# Patient Record
Sex: FEMALE | Race: WHITE | NOT HISPANIC OR LATINO | Employment: FULL TIME | URBAN - METROPOLITAN AREA
[De-identification: names, ages, dates, MRNs, and addresses within clinical notes are randomized per-mention and may not be internally consistent; named-entity substitution may affect disease eponyms.]

---

## 2017-09-18 ENCOUNTER — ALLSCRIPTS OFFICE VISIT (OUTPATIENT)
Dept: OTHER | Facility: OTHER | Age: 37
End: 2017-09-18

## 2017-09-18 DIAGNOSIS — M75.41 IMPINGEMENT SYNDROME OF RIGHT SHOULDER: ICD-10-CM

## 2017-09-18 DIAGNOSIS — G56.01 CARPAL TUNNEL SYNDROME OF RIGHT WRIST: ICD-10-CM

## 2017-09-22 ENCOUNTER — TRANSCRIBE ORDERS (OUTPATIENT)
Dept: LAB | Facility: HOSPITAL | Age: 37
End: 2017-09-22

## 2017-09-22 ENCOUNTER — APPOINTMENT (OUTPATIENT)
Dept: LAB | Facility: HOSPITAL | Age: 37
End: 2017-09-22
Attending: OBSTETRICS & GYNECOLOGY
Payer: COMMERCIAL

## 2017-09-22 DIAGNOSIS — Z22.4 GONORRHEA CARRIER: ICD-10-CM

## 2017-09-22 DIAGNOSIS — R76.0 RAISED ANTIBODY TITER: ICD-10-CM

## 2017-09-22 DIAGNOSIS — Z11.8 SCREENING EXAMINATION FOR TRACHOMA: ICD-10-CM

## 2017-09-22 DIAGNOSIS — Z11.59 SCREENING EXAMINATION FOR POLIOMYELITIS: ICD-10-CM

## 2017-09-22 DIAGNOSIS — Z31.41 FERTILITY TESTING: Primary | ICD-10-CM

## 2017-09-22 DIAGNOSIS — E07.9 UNSPECIFIED DISORDER OF THYROID: ICD-10-CM

## 2017-09-22 DIAGNOSIS — Z11.3 SCREENING EXAMINATION FOR VENEREAL DISEASE: ICD-10-CM

## 2017-09-22 DIAGNOSIS — D64.9 ANEMIA, UNSPECIFIED: ICD-10-CM

## 2017-09-22 DIAGNOSIS — Z01.83 ENCOUNTER FOR BLOOD TYPING: ICD-10-CM

## 2017-09-22 DIAGNOSIS — E28.2 POLYCYSTIC OVARIES: ICD-10-CM

## 2017-09-22 DIAGNOSIS — Z01.83 ENCOUNTER FOR BLOOD TYPING: Primary | ICD-10-CM

## 2017-09-22 DIAGNOSIS — E34.9 UNSPECIFIED ENDOCRINE DISORDER: ICD-10-CM

## 2017-09-22 DIAGNOSIS — Z31.41 FERTILITY TESTING: ICD-10-CM

## 2017-09-22 LAB
ABO GROUP BLD: NORMAL
BASOPHILS # BLD AUTO: 0.02 THOUSANDS/ΜL (ref 0–0.1)
BASOPHILS NFR BLD AUTO: 0 % (ref 0–1)
BLD GP AB SCN SERPL QL: NEGATIVE
EOSINOPHIL # BLD AUTO: 0.09 THOUSAND/ΜL (ref 0–0.61)
EOSINOPHIL NFR BLD AUTO: 2 % (ref 0–6)
ERYTHROCYTE [DISTWIDTH] IN BLOOD BY AUTOMATED COUNT: 12.8 % (ref 11.6–15.1)
HCT VFR BLD AUTO: 38.4 % (ref 34.8–46.1)
HGB BLD-MCNC: 13.3 G/DL (ref 11.5–15.4)
LYMPHOCYTES # BLD AUTO: 1.91 THOUSANDS/ΜL (ref 0.6–4.47)
LYMPHOCYTES NFR BLD AUTO: 37 % (ref 14–44)
MCH RBC QN AUTO: 29.9 PG (ref 26.8–34.3)
MCHC RBC AUTO-ENTMCNC: 34.6 G/DL (ref 31.4–37.4)
MCV RBC AUTO: 86 FL (ref 82–98)
MONOCYTES # BLD AUTO: 0.25 THOUSAND/ΜL (ref 0.17–1.22)
MONOCYTES NFR BLD AUTO: 5 % (ref 4–12)
NEUTROPHILS # BLD AUTO: 2.86 THOUSANDS/ΜL (ref 1.85–7.62)
NEUTS SEG NFR BLD AUTO: 56 % (ref 43–75)
NRBC BLD AUTO-RTO: 0 /100 WBCS
PLATELET # BLD AUTO: 252 THOUSANDS/UL (ref 149–390)
PMV BLD AUTO: 10.7 FL (ref 8.9–12.7)
PROLACTIN SERPL-MCNC: 7.6 NG/ML
RBC # BLD AUTO: 4.45 MILLION/UL (ref 3.81–5.12)
RH BLD: POSITIVE
RUBV IGG SERPL IA-ACNC: 106.5 IU/ML
SPECIMEN EXPIRATION DATE: NORMAL
T3FREE SERPL-MCNC: 2.18 PG/ML (ref 2.3–4.2)
T4 FREE SERPL-MCNC: 0.85 NG/DL (ref 0.76–1.46)
TSH SERPL DL<=0.05 MIU/L-ACNC: 1.39 UIU/ML (ref 0.36–3.74)
WBC # BLD AUTO: 5.14 THOUSAND/UL (ref 4.31–10.16)

## 2017-09-22 PROCEDURE — 86787 VARICELLA-ZOSTER ANTIBODY: CPT

## 2017-09-22 PROCEDURE — 86644 CMV ANTIBODY: CPT

## 2017-09-22 PROCEDURE — 84443 ASSAY THYROID STIM HORMONE: CPT

## 2017-09-22 PROCEDURE — 83516 IMMUNOASSAY NONANTIBODY: CPT

## 2017-09-22 PROCEDURE — 86803 HEPATITIS C AB TEST: CPT

## 2017-09-22 PROCEDURE — 84146 ASSAY OF PROLACTIN: CPT

## 2017-09-22 PROCEDURE — 80081 OBSTETRIC PANEL INC HIV TSTG: CPT

## 2017-09-22 PROCEDURE — 87491 CHLMYD TRACH DNA AMP PROBE: CPT

## 2017-09-22 PROCEDURE — 86631 CHLAMYDIA ANTIBODY: CPT

## 2017-09-22 PROCEDURE — 84481 FREE ASSAY (FT-3): CPT

## 2017-09-22 PROCEDURE — 87591 N.GONORRHOEAE DNA AMP PROB: CPT

## 2017-09-22 PROCEDURE — 36415 COLL VENOUS BLD VENIPUNCTURE: CPT

## 2017-09-22 PROCEDURE — 87661 TRICHOMONAS VAGINALIS AMPLIF: CPT

## 2017-09-22 PROCEDURE — 86704 HEP B CORE ANTIBODY TOTAL: CPT

## 2017-09-22 PROCEDURE — 86645 CMV ANTIBODY IGM: CPT

## 2017-09-22 PROCEDURE — 86790 VIRUS ANTIBODY NOS: CPT

## 2017-09-22 PROCEDURE — 84439 ASSAY OF FREE THYROXINE: CPT

## 2017-09-23 LAB
HBV CORE AB SER QL: NORMAL
HBV SURFACE AG SER QL: NORMAL
HCV AB SER QL: ABNORMAL
HTLV I+II AB SER QL IA: NEGATIVE

## 2017-09-25 LAB
CHLAMYDIA DNA CVX QL NAA+PROBE: NORMAL
HIV 1+2 AB+HIV1 P24 AG SERPL QL IA: NORMAL
MIS SERPL-MCNC: 1.15 NG/ML
N GONORRHOEA DNA GENITAL QL NAA+PROBE: NORMAL
RPR SER QL: NORMAL

## 2017-09-26 LAB
CMV IGG SERPL IA-ACNC: >10 U/ML (ref 0–0.59)
CMV IGM SERPL IA-ACNC: <30 AU/ML (ref 0–29.9)
LABORATORY COMMENT REPORT: NORMAL
VZV IGG SER IA-ACNC: NORMAL

## 2017-09-27 LAB
CHLAMYDIA IGG SER-ACNC: <0.91 RATIO (ref 0–0.9)
T VAGINALIS RRNA SPEC QL NAA+PROBE: NEGATIVE

## 2017-11-07 ENCOUNTER — GENERIC CONVERSION - ENCOUNTER (OUTPATIENT)
Dept: OTHER | Facility: OTHER | Age: 37
End: 2017-11-07

## 2017-11-09 ENCOUNTER — GENERIC CONVERSION - ENCOUNTER (OUTPATIENT)
Dept: OTHER | Facility: OTHER | Age: 37
End: 2017-11-09

## 2017-11-16 ENCOUNTER — GENERIC CONVERSION - ENCOUNTER (OUTPATIENT)
Dept: OTHER | Facility: OTHER | Age: 37
End: 2017-11-16

## 2017-11-16 ENCOUNTER — ALLSCRIPTS OFFICE VISIT (OUTPATIENT)
Dept: PERINATAL CARE | Facility: CLINIC | Age: 37
End: 2017-11-16
Payer: COMMERCIAL

## 2017-11-16 PROCEDURE — 76813 OB US NUCHAL MEAS 1 GEST: CPT | Performed by: OBSTETRICS & GYNECOLOGY

## 2017-11-16 PROCEDURE — 76801 OB US < 14 WKS SINGLE FETUS: CPT | Performed by: OBSTETRICS & GYNECOLOGY

## 2017-11-29 ENCOUNTER — GENERIC CONVERSION - ENCOUNTER (OUTPATIENT)
Dept: OTHER | Facility: OTHER | Age: 37
End: 2017-11-29

## 2017-12-01 ENCOUNTER — GENERIC CONVERSION - ENCOUNTER (OUTPATIENT)
Dept: OTHER | Facility: OTHER | Age: 37
End: 2017-12-01

## 2017-12-07 ENCOUNTER — LAB CONVERSION - ENCOUNTER (OUTPATIENT)
Dept: OTHER | Facility: OTHER | Age: 37
End: 2017-12-07

## 2017-12-07 ENCOUNTER — GENERIC CONVERSION - ENCOUNTER (OUTPATIENT)
Dept: OTHER | Facility: OTHER | Age: 37
End: 2017-12-07

## 2017-12-07 LAB
ABNORMAL MSS? (HISTORICAL): NORMAL
ABNORMAL US? (HISTORICAL): NORMAL
ADVANCED MATERNAL AGE? (HISTORICAL): NORMAL
CHROMOSOME ANALYSIS (HISTORICAL): NORMAL
CHROMOSOME, BLOOD (HISTORICAL): NOT DETECTED
CLINICAL HISTORY (HISTORICAL): NORMAL
COMMENTS: (HISTORICAL): NORMAL
FETAL FRACTION (HISTORICAL): NORMAL
GESTATIONAL AGE (HISTORICAL): 14
GESTATIONAL AGE (HISTORICAL): 5
INTERPRETATION (HISTORICAL): NORMAL
LIMITATIONS (HISTORICAL): NORMAL
METHODOLOGY (HISTORICAL): NORMAL
MICRODELETION (HISTORICAL): NOT DETECTED
MICRODELETION INTERPR. (HISTORICAL): NORMAL
NUMBER OF FETUSES (HISTORICAL): 1
SPECIFICATIONS (HISTORICAL): NORMAL
TRISOMY 13 (T13) (HISTORICAL): NEGATIVE
TRISOMY 18 (T18) (HISTORICAL): NEGATIVE
TRISOMY 21 (T21) (HISTORICAL): NEGATIVE

## 2018-01-11 ENCOUNTER — APPOINTMENT (OUTPATIENT)
Dept: PERINATAL CARE | Facility: CLINIC | Age: 38
End: 2018-01-11
Payer: COMMERCIAL

## 2018-01-11 ENCOUNTER — GENERIC CONVERSION - ENCOUNTER (OUTPATIENT)
Dept: OTHER | Facility: OTHER | Age: 38
End: 2018-01-11

## 2018-01-11 PROCEDURE — 76811 OB US DETAILED SNGL FETUS: CPT | Performed by: OBSTETRICS & GYNECOLOGY

## 2018-01-11 PROCEDURE — 76817 TRANSVAGINAL US OBSTETRIC: CPT | Performed by: OBSTETRICS & GYNECOLOGY

## 2018-01-11 NOTE — PROGRESS NOTES
2016         RE: Kyla Koyanagi                              To: Trang Ny, M D    MR#: [id number]                                   [address]   : 283 [facility] [address] [place] [address]   ENC: [id number]                             Fax: [phone]   (Exam #: [id number])      The LMP of this 38year old,  G2, P1-*-*-1 patient was 2015, her   working VICKY is SEP 1 2016 and the current gestational age is 34 weeks 5   days by 1st Trimester Sono  A sonographic examination was performed on 2016 using real time equipment  The ultrasound examination was performed   using abdominal technique  The patient has a BMI of 24 3  Her blood   pressure today was 115/64  Cardiac motion was observed at 126 bpm       INDICATIONS      previous    advanced maternal age   fetal growth      Exam Types      Level I      RESULTS      Fetus # 1 of 1   Vertex presentation   Fetal growth appeared normal   Placenta Location = Fundal   No placenta previa      MEASUREMENTS (* Included In Average GA)      AC              28 2 cm        32 weeks 2 days* (57%)   BPD              8 3 cm        33 weeks 4 days* (75%)   HC              30 0 cm        32 weeks 6 days* (52%)   Femur            5 8 cm        30 weeks 2 days* (25%)      Cerebellum       3 8 cm        32 weeks 4 days      HC/AC           1 06   FL/AC           0 21   FL/BPD          0 70   EFW (Ac/Fl/Hc)  1842 grams - 4 lbs 1 oz                 (46%)      THE AVERAGE GESTATIONAL AGE is 32 weeks 2 days +/- 18 days        AMNIOTIC FLUID      Q1: 4 8      Q2: 5 1      Q3: 2 3      Q4: 2 1   ROSIO Total = 14 4 cm   Amniotic Fluid: Normal      ANATOMY      Head                                    Normal   Heart                                   See Details   Stomach                                 Normal   Right Kidney                            Normal   Left Kidney                             Normal Bladder                                 Normal   Placenta                                Normal      ANATOMY DETAILS      Visualized Appearing Sonographically Normal:   HEAD: (Calvarium, BPD Level, Cavum, Lateral Ventricles, Choroid Plexus,   Cerebellum, Cisterna Magna); HEART: (Four Chamber View, Proximal Left   Outflow, Proximal Right Outflow, 3 Vessel Trachea, Interventricular   Septum, Interatrial Septum, Cardiac Axis, Cardiac Position);    STOMACH,   RIGHT KIDNEY, LEFT KIDNEY, BLADDER, PLACENTA      IMPRESSION      Spivey IUP   32 weeks and 2 days by this ultrasound  (VICKY=AUG 28 2016)   31 weeks and 5 days by 1st Tri Sono  (VICKY=SEP 1 2016)   Vertex presentation   Fetal growth appeared normal   Regular fetal heart rate of 126 bpm   Fundal placenta   No placenta previa      GENERAL COMMENT      On exam today the patient appears well, in no acute distress, and denies   any complaints  Her abdomen is non-tender  There has been appropriate interval fetal growth  Good fetal movement and   tone are seen  The amniotic fluid volume appears normal   The placenta is   fundal and it appears sonographically normal    The patient was informed   of today's findings and all of her questions were answered  The   limitations of ultrasound were reviewed with the patient   labor   precautions as well as fetal kick counts were reviewed  Recommend further ultrasounds as clinically indicated  Thank you very much for allowing us to participate in the care of this   very nice patient  Should you have any questions, please do not hesitate   to contact our office  EUFEMIA Glasgow M D     Electronically signed 16 08:41

## 2018-01-11 NOTE — PROGRESS NOTES
2017         RE: Surendra Braun                              To: SIMON Galo    MR#: [id number]                                   [address]   : [address]   ENC: [id number]                             Fax: [phone]   (Exam #: [id number])      The LMP of this 39year old,  G3, P2-0-0-2 patient was unknown, her   working VICKY is MAY 31 0 and the current gestational age is 16 weeks 4   days by [address] [place] [address]  A sonographic examination was performed on 2017 using real time equipment  The ultrasound examination was   performed using abdominal technique  The patient has a BMI of 21 9  Her   blood pressure today was 113/64  Earliest US on record:    10/04/17 6w4d   VICKY  17      Amira   Is on prenatal vitamins, folic acid and vitamin D  She denies   any  allergies or any use of cigarettes, alcohol or illicit drugs  She   denies any significant past medical history other then her advanced   maternal age and reports her surgical history includes a   She   has had two prior children  One weighed 7 pounds and was in 2013 that   delivered by  section for a failed induction after PROM at term  Her 2nd baby delivered in 2016 vaginally after induction  and weight 7   pounds  She initially was being seen in [address] [place] [address]   for infertility but found out that she got pregnant on her own and had her   1st ultrasound for dating there  She denies any 1st generation family   history of hypertension, diabetes, thrombosis or congenital anomalies  She is considering another   She is here for genetic screening  Multiple longitudinal and transverse sections revealed a seaman   intrauterine pregnancy with the fetus in variable presentation  The   placenta is anterior in implantation, grade I in appearance        Cardiac motion was observed at 174 bpm  INDICATIONS      first trimester genetic screening   previous    advanced maternal age      Exam Types      Level I   sequential screen      RESULTS      Fetus # 1 of 1   Variable presentation      MEASUREMENTS (* Included In Average GA)      CRL              6 1 cm        12 weeks 3 days*   Nuchal Trans    1 50 mm      THE AVERAGE GESTATIONAL AGE is 12 weeks 3 days +/- 7 days  ANATOMY COMMENTS      Anatomic detail is limited at this gestational age  The fetal cranium   appeared normal in shape and the nuchal translucency was normal in size   (1 5mm)  The nasal bone appears to be present  The intracranial anatomy   was unremarkable  Evaluation of the spine revealed no obvious evidence   for a neural tube defect  Anatomy of the fetal thorax appeared within   normal limits with a normal cardiac axis and first trimester three vessel   view  The cardiac rhythm was regular and documented with M-mode  Within   the abdomen, the stomach & bladder were visualized and the abdominal wall   appeared intact  A three vessel cord appears to be present  Active   movement of the fetal body & extremities was seen  There is no suspicion   of a subchorionic bleed  The placental cord insertion appeared normal      There is no suspicion of a uterine myoma  Free fluid is not seen in the   posterior cul-de-sac  ADNEXA      The left ovary appeared normal and measured 2 6 x 2 3 x 2 4 cm with a   volume of 7 5 cc   The right ovary appeared normal and measured 2 4 x 3 0 x   1 4 cm with a volume of 5 3 cc       AMNIOTIC FLUID         Largest Vertical Pocket = 3 7 cm   Amniotic Fluid: Normal      IMPRESSION      Spivey IUP   12 weeks and 3 days by this ultrasound  (VICKY=MAY 28 2018)   12 weeks and 4 days by [facility] Tri Sono  (VICKY=MAY 27 2018)   Variable presentation   Regular fetal heart rate of 174 bpm   Anterior placenta      GENERAL COMMENT      OFFICE CONSULT      As per your request, a consultation was performed on your patient for the   following indication: AMA- Advanced Maternal Age      Risks:   1  AMA- with a 1 5 % risk for any type of aneuploidy at the time of an   amniocentesis  She understands that a Sequential Screen may miss 10% of   the babies with Downs syndrome or Trisomy 25 and that it is not a great   screen for other chromosome problems which her age puts her at risk for  Ultrasound is also not a good screen for aneuploidy as it may miss up to   50% of the babies with a chromosome problem  High risk patients such as   AMA patients, those with family hx of aneuploidy, an abnormal result on   the other genetic screens or US evidence for malformations have other   options such as invasive genetic screening on the placenta/amniotic fluid   or Cell free DNA screening completed on maternal blood  Cell free DNA   screening picks up 99% of babies with Downs and 97% of babies with T18  The false positive risk for Downs and T18 if found is 0 1%  This test also   can  7 of 11 babies with T13 with a false positive rate of 0 2%  This noninvasive test though does not screen for all types of aneuploidy   and needs to be confirmed by invasive genetic testing  Invasive genetic   testing includes an amniocentesis or CVS and yields the most information   on all of the chromosomes  Risks and benefits of both invasive procedures   were reviewed including an increased risk of loss of 0 5% over the   baseline risk  It was explained that normal chromosomes and a normal   ultrasound do not guarantee a normal baby nor a normal pregnancy outcome  Patient verbalized understanding and wanted to proceed with Cell free DNA   testing  The patient was informed of the findings and counseled about the   limitations of the exam in detecting all forms of fetal congenital   abnormalities  She denies any vaginal bleeding or uterine cramping/contractions  Follow up recommended:   1   Fetal Level II ultrasound imaging is recommended at 19-20 weeks'   gestation  2  Recommend a 32 week growth scan due to the patients risk factor of   being advanced maternal age  3  I recommend a follow-up MSAFP at 16-18 weeks as Cell free DNA screening   does not screen for neural tube defects which our office will order  Her   insurance is requiring preauthorization for NIPT which may take 7-10 days  The face to face time, in addition to time spent discussing ultrasound   results, was approximately 15 minutes, greater than 50% of which was spent   during counseling and coordination of care  EUFEMIA Evans M D     Maternal-Fetal Medicine   Electronically signed 11/16/17 19:32

## 2018-01-14 VITALS
DIASTOLIC BLOOD PRESSURE: 72 MMHG | HEIGHT: 68 IN | BODY MASS INDEX: 20.78 KG/M2 | SYSTOLIC BLOOD PRESSURE: 110 MMHG | WEIGHT: 137.13 LBS | HEART RATE: 83 BPM

## 2018-01-17 NOTE — PROGRESS NOTES
2016         RE: Jeanette Isaacs                              To: SIMON Shetty    MR#: [id number]                                   [address]   : 283 [facility] 550 [address] [place] [address], [address] [place] [address]   ENC: [id number]                             Fax: [phone]   (Exam #: [id number])      The LMP of this 38year old,  2, para 1 patient was 2015,   her working VICKY is SEP 1 2016 and the current gestational age is 24 weeks   4 days by 1st Trimester Sono  A sonographic examination was performed on   2016 using real time equipment  The ultrasound examination was   performed using abdominal & vaginal techniques  The patient has a BMI of   21 7  Her blood pressure today was 102/53        Cardiac motion was observed at 142 bpm       INDICATIONS      previous    advanced maternal age   fetal anatomical survey      Exam Types      Transvaginal   LEVEL II      RESULTS      Fetus # 1 of 1   Vertex presentation   Fetal growth appeared normal   Placenta Location = Posterior   No placenta previa      MEASUREMENTS (* Included In Average GA)      AC              14 3 cm        19 weeks 2 days* (47%)   BPD              4 5 cm        19 weeks 4 days* (52%)   HC              17 3 cm        19 weeks 6 days* (53%)   Femur            3 0 cm        19 weeks 3 days* (36%)      Nuchal Fold      4 8 mm      Humerus          2 9 cm        19 weeks 2 days  (45%)   Radius           2 6 cm        20 weeks 4 days   Ulna             2 7 cm        19 weeks 6 days   Tibia            2 6 cm        19 weeks 2 days  (31%)   Fibula           2 7 cm        19 weeks 1 day   Foot             3 0 cm        18 weeks 6 days      Cerebellum       2 1 cm        20 weeks 6 days   Biorbit          3 1 cm        20 weeks 1 day   CisternaMagna    3 6 mm      HC/AC           1 21   FL/AC           0 21   FL/BPD          0 66   EFW (Ac/Fl/Hc)   296 grams - 0 lbs 10 oz      THE AVERAGE GESTATIONAL AGE is 19 weeks 4 days +/- 10 days  AMNIOTIC FLUID      Largest Vertical Pocket = 4 7 cm   Amniotic Fluid: Normal      CERVICAL EVALUATION           Supine               Cervical Length: 3 80 cm        Other Test Results         Resp To T F  Pressure: No                    Funneling?: No              Dynamic Changes?: No      ANATOMY DETAILS      Visualized Appearing Sonographically Normal:   HEAD: (Calvarium, BPD Level, Lateral Ventricles, Choroid Plexus,   Cerebellum, Cisterna Magna);    FACE/NECK: (Neck, Nuchal Fold, Profile,   Orbits, Nose/Lips, Palate, Face);    TH  CAV : (Diaphragm); HEART:   (Four Chamber View, Proximal Left Outflow, Proximal Right Outflow, Aortic   Arch, Ductal Arch, Short Axis of Greater Vessels, Cardiac Axis,   Interventricular Septum, Interatrial Septum, IVC, SVC, Cardiac Position);      ABD  CAV : (Gall Bladder);    STOMACH, RIGHT KIDNEY, LEFT KIDNEY,   BLADDER, ABD  WALL, SPINE: (Cervical Spine, Thoracic Spine, Lumbar Spine,   Sacrum);    EXTREMS: (Lt Humerus, Rt Humerus, Lt Forearm, Rt Forearm, Lt   Hand, Rt Hand, Lt Femur, Rt Femur, Lt Low Leg, Rt Low Leg, Lt Foot, Rt   Foot);    GENITALIA, PLACENTA, UMBL  CORD, UTERUS      ADNEXA      The left ovary appeared normal and measured 2 8 x 2 3 x 1 9 cm with a   volume of 6 4 cc  The right ovary appeared normal and measured 2 4 x 2 9 x   2 2 cm with a volume of 8 0 cc  IMPRESSION      Spivey IUP   19 weeks and 4 days by this ultrasound  (VICKY=SEP 1 2016)   19 weeks and 4 days by 1st Tri Sono  (VICKY=SEP 1 2016)   Vertex presentation   Fetal growth appeared normal   Normal anatomy survey   Regular fetal heart rate of 142 bpm   Posterior placenta   No placenta previa      GENERAL COMMENT      On exam today the patient appears well, in no acute distress, and denies   any complaints  Her abdomen is non-tender        The patient had noninvasive prenatal testing through [employer] test   Her results were normal, placing her in a   very low risk category  The sensitivity of detecting Trisomy 21 with this   test is 99 1% with a specificity of 99 9%  The sensitivity of detecting   Trisomy 18 is >99 9% with a specificity of 99 6%  The sensitivity of   detecting Trisomy 13 is 91 7% with a specificity of 99 7%  Her negative   result is very reassuring to rule out the aforementioned Trisomies  The fetal anatomic survey is complete  There is no sonographic evidence   of fetal abnormalities at this time  Good fetal movement and tone are   seen  The amniotic fluid volume appears normal   The placenta is   posterior and it appears sonographically normal   A transvaginal   ultrasound was performed to assess the cervix, which was not seen well   transabdominally  The cervical length was 3 8 centimeters, which is   normal for the current gestational age  There was no significant   funneling or dynamic changes appreciated  The patient was informed of   today's findings and all of her questions were answered  The limitations   of ultrasound were reviewed with the patient, which she accepts  We discussed followup and I recommend Amira return to the    Center in 12 weeks for a growth ultrasound for the indication of advanced   maternal age  Please note, in addition to the time spent discussing the results of the   ultrasound, I spent approximately 10 minutes of face-to-face time with the   patient, greater than 50% of which was spent in counseling and the   coordination of care for this patient  Thank you very much for allowing us to participate in the care of this   very nice patient  Should you have any questions, please do not hesitate   to contact our office  RECOMMENDATION      Growth Ultrasound: at 32 weeks      EUFEMIA Gaffney M D     Electronically signed 16 10:56

## 2018-01-18 NOTE — PROGRESS NOTES
2016         RE: Kyla Koyanagi                              To: SIMON Dillard    MR#: [id number]                                   [address]   : 283 [facility] [address] [place] [address]   ENC: [id number]                             Fax: [phone]   (Exam #: [id number])      The LMP of this 38year old,  2, para 1 patient was 2015,   her working VICKY is SEP 1 2016 and the current gestational age is 16 weeks   4 days by  [address] [place] [address]  A sonographic examination was performed on   2016 using real time equipment  The ultrasound examination was   performed using abdominal technique  The patient has a BMI of 20 5  Her   blood pressure today was 119/70  Thank you very much for referring this very nice patient for a    consultation and genetic screening ultrasound  This is the patient's   second pregnancy and was the result of a Clomid cycle  She has a history   of a prior full-term  section without complications  She has a   history of childhood asthma although currently is not required the use of   any inhalers  She denies the current use of tobacco, alcohol, or drugs  Medications include prenatal vitamins and folic acid and Amira has no   significant drug allergies  Family medical history is noncontributory  A   review of systems is otherwise negative  On exam, the patient appears   well, in no acute distress, and her abdomen is nontender  Multiple longitudinal and transverse sections revealed a seaman   intrauterine pregnancy with the fetus in variable presentation  The   placenta is posterior in implantation, grade I in appearance        Cardiac motion was observed at 158 bpm       INDICATIONS      first trimester genetic screening   previous    advanced maternal age      Exam Types      Level I      RESULTS      Fetus # 1 of 1   Variable presentation   Fetal growth appeared normal      MEASUREMENTS (* Included In Average GA)      CRL              6 3 cm        12 weeks 4 days*   Nuchal Trans    2 50 mm      THE AVERAGE GESTATIONAL AGE is 12 weeks 4 days +/- 7 days  UTERINE ARTERIES                                  S/D   PI    RI    NOTCH       Left Uterine Artery        3 52  1 49  0 72       Right Uterine Artery       3 92  1 59  0 74      ANATOMY COMMENTS      Anatomic detail is limited at this gestational age  The yolk sac was not   noted  The fetal cranium appeared normal in shape and the nuchal   translucency measures 2 5mm, which is less than the 95th%tile for CRL  The nasal bone appears to be present  The intracranial anatomy was   unremarkable  Evaluation of the spine revealed no obvious evidence for a   neural tube defect  Anatomy of the fetal thorax appeared within normal   limits  The cardiac rhythm was regular  Within the abdomen, stomach &   bladder were visualized and the abdominal wall appeared intact  A three   vessel cord appears to be present  Active movement of the fetal body &   extremities was seen  There is no suspicion of a subchorionic bleed  The   placental cord insertion was normal    The uterine artery Dopplers are   normal for this gestational age  There is no suspicion of a uterine myoma  Free fluid is not seen in the posterior cul-de-sac  ADNEXA      The left ovary was not visualized   The right ovary appeared normal and   measured 3 0 x 1 8 x 1 3 cm with a volume of 3 7 cc       AMNIOTIC FLUID      Largest Vertical Pocket = 3 4 cm   Amniotic Fluid: Normal      IMPRESSION      Spivey IUP   12 weeks and 4 days by this ultrasound  (VICKY=SEP 1 2016)   12 weeks and 4 days by [facility] Tri Sono  (VICKY=SEP 1 2016)   Variable presentation   Fetal growth appeared normal   Regular fetal heart rate of 158 bpm   Posterior placenta      GENERAL COMMENT      We discussed the options for genetic screening, including but not limited   to first trimester screening, second trimester screening, combined first   and second trimester screening, noninvasive prenatal testing (NIPT) for   patients at high risk and diagnostic screening through the use of CVS and   amniocentesis  We discussed the risks and benefits of each approach   including the sensitivities and false positive rates as well as the   difference between a screening test and a diagnostic test   At the   conclusion of our discussion the patient elected noninvasive prenatal   testing utilizing the MaterniT 21 plus test   The patient had her blood   drawn in our office and the results should be available in approximately   7-10 days  The nuchal translucency measures 2 5mm, which is less than the   95th%tile for CRL  The nasal bone appears to be present  Recommend an anatomy ultrasound be scheduled for 20 weeks gestation  Please note, in addition to the time spent discussing the results of the   ultrasound, I spent approximately 15 minutes of face-to-face time with the   patient, greater than 50% of which was spent in counseling and the   coordination of care for this patient  Thank you very much for allowing us to participate in the care of this   very nice patient  Should you have any questions, please do not hesitate   to contact our office  EUFEMIA Villegas M D     Electronically signed 02/22/16 13:36

## 2018-01-22 VITALS
SYSTOLIC BLOOD PRESSURE: 113 MMHG | BODY MASS INDEX: 21.82 KG/M2 | HEIGHT: 68 IN | WEIGHT: 144 LBS | DIASTOLIC BLOOD PRESSURE: 64 MMHG

## 2018-01-23 ENCOUNTER — ALLSCRIPTS OFFICE VISIT (OUTPATIENT)
Dept: PERINATAL CARE | Facility: CLINIC | Age: 38
End: 2018-01-23
Payer: COMMERCIAL

## 2018-01-23 PROCEDURE — 76816 OB US FOLLOW-UP PER FETUS: CPT | Performed by: OBSTETRICS & GYNECOLOGY

## 2018-01-23 PROCEDURE — 76817 TRANSVAGINAL US OBSTETRIC: CPT | Performed by: OBSTETRICS & GYNECOLOGY

## 2018-01-23 NOTE — RESULT NOTES
Verified Results  (Q) QNATAL (TM) ADVANCED [id number] 11:26AM Hung Wesley   REPORT COMMENT:  FASTING:UNKNOWN     Test Name Result Flag Reference   NUMBER OF FETUSES 1     ADVANCED MATERNAL AGE? NOT GIVEN     ABNORMAL YOANNA? NOT GIVEN     ABNORMAL US? NOT GIVEN     PERSONAL/FAM HISTORY? NOT GIVEN     INTERPRETATION SEE NOTE     This specimen showed expected representation of chromosome  21, 18, and 13 material    TRISOMY 21 (T21) Negative     TRISOMY 18 (T18) Negative     TRISOMY 13 (T13) Negative     Y CHROMOSOME Not detected     Y CHR  INTERPRETATION SEE NOTE     Consistent with a female fetus  SEX CHROMOSOME No aneuploidy     SEX CHROMOSOME INTERP SEE NOTE     No apparent abnormality was detected  See "Limitations"  below  MICRODELETION Not detected     MICRODELETION INTERP SEE NOTE     No apparent abnormality was detected  See "Limitations"  below  GESTATION AGE (IN WEEKS) 14     GESTATIONAL AGE (IN DAYS) 5     FETAL FRACTION 11 20%     LABORATORY COMMENTS SEE NOTE     Laboratory results and submitted clinical information  reviewed by Maria Luz Alberto, Ph D , Eliza Rubi  LIMITATIONS SEE NOTE     This test has been validated on women with a seaman  pregnancy that is >=10 weeks gestational age  As such, the  accuracy of this test for specimens drawn at less than 10  weeks gestation is unknown  In addition, there are limited  data available for the performance of this test in multiple  gestation pregnancies and for the detection of  microdeletions  Specimens are analyzed for aneuploidies  involving chromosomes 21, 18, 13, X, and Y, and  microdeletions of the specified regions only  The Y  chromosome is analyzed for the determination of fetal sex,  and the sensitivity and specificity of this analysis may be  less than that of the autosome analysis  Sex chromosome  aneuploidy analysis is not performed for multiple gestation  pregnancies   Aneuploidies involving chromosomes other than  those specified above or abnormalities involving chromosomal  regions other than those specified are not included in this  testing  While the results of this test are highly accurate,  not all of the chromosome abnormalities interrogated may be  detected due to maternal, placental, or fetal mosaicism, or  other unexplained causes  The accuracy of the test results  may also be affected by the presence of chromosome  abnormalities or copy number variations that are maternal in  origin, or by vanishing twin syndrome in a multiple  gestation pregnancy  Circulating cell-free fetal DNA  screening does not replace the precision of diagnosis using  chorionic villus sampling or amniocentesis  It does not  assess the risk of fetal anomalies such as neural tube  defects or ventral wall defects, and should not be  considered in isolation from other clinical findings and  laboratory test results  Management decisions, including  pregnancy termination, should not be based solely on the  results of cell-free DNA screening  A negative test result  does not ensure an unaffected pregnancy  The healthcare  provider is responsible for the use of this information in  the management of his/her patient  Health care providers, please contact your local [employer] genetic counselor or call [employer]  [facility] at [employer] ([phone]) for assistance with  interpretation of these results  SPECIFICATIONS SEE NOTE     Sensitivity         Specificity  T21        >99 9%               >99 9%     T18        >99 9%               >99 9%     T13        >99 9%               >99 9%               Accuracy  Y          >99 9%     Performance of the [employer] laboratory-developed test  (LDT) has been determined based on internal analytical  assessment  METHODOLOGY SEE NOTE     Circulating cell-free (cf) DNA was isolated from plasma  It  was then detected on a massively parallel sequencing  platform   Bioinformatic analysis was performed to determine  the representation of fetal DNA in the specimen, especially  fetal material from chromosomes 21, 18, and 13  The  representation of other fetal material, including the sex  chromosomes (X and Y) and select chromosomal regions (22q,  15q, 11q, 8q, 5p, 4p 1p), was also evaluated and will only  be reported as "Additional Chromosome Results" when an  abnormality is detected  This test was developed and its  performance characteristics have been determined by [facility], [facility] [address]  It has  not been cleared or approved by the U S  Food and Drug  Administration  The FDA has determined that such clearance  or approval is not necessary  Performance characteristics  refer to the analytical performance of the test  This test  is performed pursuant to a license agreement with [employer] [facility]  This test was developed and its analytical performance  characteristics have been determined by [facility]  It has not been  cleared or approved by FDA  This assay has been validated  pursuant to the CLIA regulations and is used for clinical  purposes

## 2018-01-23 NOTE — MISCELLANEOUS
Message  patient called nurse line that she had Zainab Moyer drawn today 17   patient verbalized concern our M office will call with gender reveal, explained to patient it is our policy that MFM office will call with result of Chromosomes only  patient informed that if she has [facility], set up for lab value notification, she may get NIPT result before our office notifies her  Active Problems    1  Carpal tunnel syndrome of right wrist (354 0) (G56 01)   2  History of  section complicating pregnancy (840 80) (O34 219)   3  Multigravida of advanced maternal age in second trimester (65 56) (O09 522)   4  Subacromial impingement of right shoulder (726 19) (M75 41)    Current Meds   1  Folic Acid 1 MG Oral Tablet; Therapy: (Recorded:36Hch5595) to Recorded   2  PNV-Select TABS; TAKE 1 TABLET DAILY; Therapy: (Recorded:81Erh3604) to Recorded   3  Vitamin D 2000 UNIT Oral Capsule; Therapy: (Recorded:33Alc7780) to Recorded    Allergies    1  Seasonal  Denied    2   [address] [facility] CAPS    Signatures   Electronically signed by : Arely Lara, ; Dec  1 2017  3:36PM EST                       (Author)

## 2018-01-24 VITALS
BODY MASS INDEX: 23.64 KG/M2 | HEIGHT: 68 IN | SYSTOLIC BLOOD PRESSURE: 112 MMHG | DIASTOLIC BLOOD PRESSURE: 60 MMHG | WEIGHT: 156 LBS

## 2018-01-24 NOTE — PROGRESS NOTES
History of Present Illness   HPI: Please see her ultrasound report under the radiology tab  Active Problems   1  History of  section complicating pregnancy (115 65) (O34 219)   2  Multigravida of advanced maternal age in second trimester (65 59) (O09 522)    Past Medical History   1  Encounter for fetal anatomic survey (V28 81) (Z36 89)   2  History of Subacromial impingement of right shoulder (726 19) (M75 41)    Surgical History   1  History of Foot Surgery    Family History   1  Family history of asthma (V17 5) (Z82 5)    Social History    · Never a smoker   · Never a smoker   · Non smoker / tobacco user (V49 89) (Z78 9)    Current Meds    1  Folic Acid 1 MG Oral Tablet; Therapy: (Recorded:77Wlz5624) to Recorded   2  PNV-Select TABS; TAKE 1 TABLET DAILY; Therapy: (Recorded:29Dog2497) to Recorded   3  Vitamin D 2000 UNIT Oral Capsule; Therapy: (Recorded:56Gfr0101) to Recorded    Allergies   1  Seasonal  Denied    2   [address] [facility] CAPS    Vitals   Vital Signs    Recorded: 66ZEI4451 67:00LJ   Systolic 225, LUE, Sitting   Diastolic 58, LUE, Sitting   Height 5 ft 8 in   Weight 159 lb    BMI Calculated 24 18   BSA Calculated 1 85   Pain Scale 0     Signatures    Electronically signed by : Raulito Vincent MD2018  6:10PM EST                       (Author)

## 2018-01-27 NOTE — MISCELLANEOUS
Message  Pt notified and aware NIPT testing approved by [place] [facility] number [id number] from 2017-2018  Pt will contact [facility] when testing completed for tracking purposes  Pt received TRF at [facility] 120 visit   Active Problems    1  Carpal tunnel syndrome of right wrist (354 0) (G56 01)   2  History of  section complicating pregnancy (345 57) (O34 219)   3  Multigravida of advanced maternal age in second trimester (65 56) (O09 522)   4  Subacromial impingement of right shoulder (726 19) (M75 41)    Current Meds   1  Folic Acid 1 MG Oral Tablet; Therapy: (Recorded:51Wss3908) to Recorded   2  PNV-Select TABS; TAKE 1 TABLET DAILY; Therapy: (Recorded:72Xwl3115) to Recorded   3  Vitamin D 2000 UNIT Oral Capsule; Therapy: (Recorded:51Bsj3761) to Recorded    Allergies    1  Seasonal  Denied    2   [address] [facility] CAPS    Signatures   Electronically signed by : Glenda Mariscal RN; 2017  3:16PM EST                       (Author)

## 2018-02-26 NOTE — PROGRESS NOTES
2018         RE: Della Willis                              To: CatrachitaSIMON Castañeda    MR#: [id number]                                   [address]   : [address] [facility] 550 25 [address] [place] [address], [address] [place] [address]   ENC: [id number]                             Fax: [phone]   (Exam #: [id number])      The LMP of this 44year old,  G3, P2-0-0-2 patient was unknown, her   working VICKY is MAY 31 0 and the current gestational age is 25 weeks 4   days by  [facility] [address]  A sonographic examination was performed on 2018 using real time equipment  The ultrasound examination was   performed using abdominal & vaginal techniques  The patient has a BMI of   23 7  Her blood pressure today was 112/60  Earliest US on record:    10/04/17 6w4d   VICKY  18      Cardiac motion was observed at 147 bpm       INDICATIONS      fetal anatomical survey   previous    advanced maternal age      Exam Types      LEVEL II   Transvaginal      RESULTS      Fetus # 1 of 1   Vertex presentation   Fetal growth appeared normal   Placenta Location = Anterior   Placenta Grade = I      MEASUREMENTS (* Included In Average GA)      AC              15 2 cm        20 weeks 1 day * (38%)   BPD              4 5 cm        19 weeks 5 days* (20%)   HC              16 9 cm        19 weeks 3 days* (11%)   Femur            3 2 cm        20 weeks 1 day * (30%)      Nuchal Fold      3 1 mm      Humerus          3 1 cm        20 weeks 3 days  (45%)      Cerebellum       2 2 cm        21 weeks 4 days   CisternaMagna    5 7 mm      HC/AC           1 11   FL/AC           0 21   FL/BPD          0 71   EFW (Ac/Fl/Hc)   331 grams - 0 lbs 12 oz      THE AVERAGE GESTATIONAL AGE is 19 weeks 6 days +/- 10 days        AMNIOTIC FLUID         Largest Vertical Pocket = 7 2 cm   Amniotic Fluid: Normal      CERVICAL EVALUATION      SUPINE      Cervical Length: 2 67 cm      OTHER TEST RESULTS           Funneling?: No             Dynamic Changes?: No        Resp  To TFP?: No      ANATOMY      Head                                    Normal   Face/Neck                               See Details   Th  Cav  Normal   Heart                                   See Details   Abd  Cav  Normal   Stomach                                 Normal   Right Kidney                            Normal   Left Kidney                             Normal   Bladder                                 Normal   Abd  Wall                               Normal   Spine                                   See Details   Extrems                                 Abnormal   Genitalia                               Normal   Placenta                                Normal   Umbl  Cord                              Normal   Uterus                                  Normal   PCI                                     Normal      ANATOMY DETAILS      Visualized Appearing Sonographically Normal:   HEAD: (Calvarium, BPD Level, Cavum, Lateral Ventricles, Choroid Plexus,   Cerebellum, Cisterna Magna);    FACE/NECK: (Neck, Nuchal Fold, Profile,   Orbits, Nose/Lips);    TH  CAV  : (Lungs, Diaphragm); HEART: (Four   Chamber View, Proximal Left Outflow, Proximal Right Outflow, 3VV, 3 Vessel   Trachea, Short Axis of Greater Vessels, Ductal Arch, IVC, SVC, Cardiac   Axis, Cardiac Position);    ABD  CAV : (Liver);    STOMACH, RIGHT KIDNEY,   LEFT KIDNEY, BLADDER, ABD  WALL, EXTREMS: (Lt Humerus, Rt Humerus, Lt   Forearm, Rt Forearm, Lt Hand, Rt Hand, Lt Femur, Rt Femur, Rt Low Leg, Lt   Foot, Rt Foot);    GENITALIA (Female), PLACENTA, UMBL  CORD, UTERUS, PCI      Suboptimally Visualized:   FACE/NECK: (Palate);    SPINE: (Cervical Spine, Thoracic Spine, Lumbar   Spine, Sacrum)      Not Visualized:   FACE/NECK: (Face);     HEART: (Aortic Arch, Interventricular Septum,   Interatrial Septum)      Abnormal:   EXTREMS: (Lt Low Leg) ADNEXA      The left ovary appeared normal and measured 2 8 x 2 9 x 1 7 cm with a   volume of 7 1 cc  The right ovary appeared normal and measured 1 9 x 2 5 x   1 0 cm with a volume of 2 3 cc  IMPRESSION      Spivey IUP   19 weeks and 6 days by this ultrasound  (VICKY=2018)   20 weeks and 4 days by 1st Tri [facility]  (VICKY=MAY 27 2018)   Vertex presentation   Fetal growth appeared normal   Regular fetal heart rate of 147 bpm   Anterior placenta      GENERAL COMMENT      Ms Giovanni Jiménez is here for anatomy survey and cervical length screening  She   has a prior  delivery and successful TOLAC and is of advanced   maternal age  There is a single live intrauterine pregnancy with size equivalent to   dates  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  No other anomalies are   identified though the anatomic survey is incomplete  Amniotic fluid is within normal limits  Transvaginal cervical length measures 26mm which is somewhat shortened   though measurement was limited by angle of cervix (I suspect it is longer   than 26mm)  Evaluation and Management:   The patient was counseled regarding the above findings  A total of 10   minutes were spent in this encounter with >50% of the time spent in   face-to-face counseling and in coordination of care  The limitations of   ultrasound were reviewed  We reviewed the finding today of 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   based on both 2D and 3D imaging  This finding appears to be isolated  While the differential diagnosis includes rocker bottom foot,   ectrodactyly, and amniotic band syndrome, I do not have a suspicion that   any of these are present  Isolated clubfoot has a low aneuploidy rate   (1 7-2 3%)  While the spine was not optimally imaged today, I do not   suspect that spina bifida is present  Amniotic fluid index is normal     Talipes equinovarus is the most common musculoskeletal anomaly, present in   1:1000 newborns  The prognosis depends on associated anomalies, and at   this time I do not suspect that there are any additional associated   anomalies  There also does not appear to be a neurologic or movement   disorder present  There is an approximate 2-12% false-positive rate   though overall detection rate is approximately 75-80%  Her aneuploidy   screening was within normal limits and today's finding does not have an   appearance of a rocker bottom foot therefore suspicion for association   with this finding with trisomy 18 is low (additionally there are no major   structural abnormalities to suggest trisomy 18)  We discussed   conservative management including  casting and reviewed that in   more severe cases surgical therapy may be necessary  Later on, a referral   to pediatric orthopedic surgery would be appropriate, although for now we   can follow the appearance serially over time  We also discussed the findings of her cervix measurement of 26mm  She has   2 prior term deliveries  I would like to repeat the measurement in 2   weeks; if not shortened at that time, no progesterone would be indicated  Missed anatomy can be evaluated at that time  She should return in 8 weeks for growth in the setting of left talipes   equinovarus and also AMA  At the conclusion of today's encounter, all questions were answered to her   satisfaction  Thank you very much for this kind referral and please do   not hesitate to contact me with any further questions or concerns  Claudell Ruths, R D M S , EUFEMIA MERRITT S  Lendon Gilford, M D     Maternal Fetal Medicine   Electronically signed 18 09:56

## 2018-03-08 ENCOUNTER — ULTRASOUND (OUTPATIENT)
Dept: PERINATAL CARE | Facility: CLINIC | Age: 38
End: 2018-03-08
Payer: COMMERCIAL

## 2018-03-08 VITALS
DIASTOLIC BLOOD PRESSURE: 68 MMHG | HEIGHT: 70 IN | SYSTOLIC BLOOD PRESSURE: 117 MMHG | BODY MASS INDEX: 24.51 KG/M2 | HEART RATE: 98 BPM | WEIGHT: 171.2 LBS

## 2018-03-08 DIAGNOSIS — O34.219 MATERNAL CARE FOR SCAR FROM PREVIOUS CESAREAN DELIVERY, UNSPECIFIED PRIOR CESAREAN DELIVERY TYPE: ICD-10-CM

## 2018-03-08 DIAGNOSIS — O35.8XX0 PREGNANCY COMPLICATED BY ABNORMALITY OF FETAL EXTREMITY, SINGLE OR UNSPECIFIED FETUS: ICD-10-CM

## 2018-03-08 DIAGNOSIS — O09.523 ELDERLY MULTIGRAVIDA IN THIRD TRIMESTER: ICD-10-CM

## 2018-03-08 DIAGNOSIS — Z3A.28 28 WEEKS GESTATION OF PREGNANCY: Primary | ICD-10-CM

## 2018-03-08 PROCEDURE — 99212 OFFICE O/P EST SF 10 MIN: CPT | Performed by: OBSTETRICS & GYNECOLOGY

## 2018-03-08 NOTE — PROGRESS NOTES
[address]: Ms Jacqui Joaquin was seen today at 28w5d for fetal growth and followup missed anatomy ultrasound  See ultrasound report under "OB Procedures" tab  Please don't hesitate to contact our office with any concerns or questions    Jenniffer Pacheco MD

## 2018-04-12 ENCOUNTER — ULTRASOUND (OUTPATIENT)
Dept: PERINATAL CARE | Facility: CLINIC | Age: 38
End: 2018-04-12
Payer: COMMERCIAL

## 2018-04-12 VITALS
BODY MASS INDEX: 25.43 KG/M2 | HEART RATE: 106 BPM | WEIGHT: 175.2 LBS | DIASTOLIC BLOOD PRESSURE: 56 MMHG | SYSTOLIC BLOOD PRESSURE: 121 MMHG

## 2018-04-12 DIAGNOSIS — O09.523 ELDERLY MULTIGRAVIDA IN THIRD TRIMESTER: Primary | ICD-10-CM

## 2018-04-12 DIAGNOSIS — Z3A.33 33 WEEKS GESTATION OF PREGNANCY: ICD-10-CM

## 2018-04-12 PROCEDURE — 99212 OFFICE O/P EST SF 10 MIN: CPT | Performed by: OBSTETRICS & GYNECOLOGY

## 2018-04-12 PROCEDURE — 76816 OB US FOLLOW-UP PER FETUS: CPT | Performed by: OBSTETRICS & GYNECOLOGY

## 2018-04-12 NOTE — PROGRESS NOTES
Please refer to the [facility] ultrasound report in Ob Procedures for additional information regarding the visit to the Counts include 234 beds at the [facility]  today

## 2018-05-01 ENCOUNTER — LAB REQUISITION (OUTPATIENT)
Dept: LAB | Facility: HOSPITAL | Age: 38
End: 2018-05-01
Payer: COMMERCIAL

## 2018-05-01 DIAGNOSIS — Z36.85 ENCOUNTER FOR ANTENATAL SCREENING FOR STREPTOCOCCUS B: ICD-10-CM

## 2018-05-01 DIAGNOSIS — Z36.89 ENCOUNTER FOR OTHER SPECIFIED ANTENATAL SCREENING: ICD-10-CM

## 2018-05-01 PROCEDURE — 87653 STREP B DNA AMP PROBE: CPT | Performed by: OBSTETRICS & GYNECOLOGY

## 2018-05-03 LAB — GP B STREP DNA SPEC QL NAA+PROBE: ABNORMAL

## 2018-05-10 ENCOUNTER — ULTRASOUND (OUTPATIENT)
Dept: PERINATAL CARE | Facility: CLINIC | Age: 38
End: 2018-05-10
Payer: COMMERCIAL

## 2018-05-10 VITALS
HEIGHT: 68 IN | BODY MASS INDEX: 26.92 KG/M2 | SYSTOLIC BLOOD PRESSURE: 116 MMHG | DIASTOLIC BLOOD PRESSURE: 71 MMHG | HEART RATE: 84 BPM | WEIGHT: 177.6 LBS

## 2018-05-10 DIAGNOSIS — Z3A.38 38 WEEKS GESTATION OF PREGNANCY: ICD-10-CM

## 2018-05-10 DIAGNOSIS — O09.523 ELDERLY MULTIGRAVIDA IN THIRD TRIMESTER: ICD-10-CM

## 2018-05-10 DIAGNOSIS — O34.219 HISTORY OF CESAREAN DELIVERY, ANTEPARTUM: ICD-10-CM

## 2018-05-10 DIAGNOSIS — O36.5930 INTRAUTERINE GROWTH RESTRICTION AFFECTING ANTEPARTUM CARE OF MOTHER IN THIRD TRIMESTER, NOT APPLICABLE OR UNSPECIFIED FETUS: Primary | ICD-10-CM

## 2018-05-10 PROCEDURE — 76820 UMBILICAL ARTERY ECHO: CPT | Performed by: OBSTETRICS & GYNECOLOGY

## 2018-05-10 PROCEDURE — 76816 OB US FOLLOW-UP PER FETUS: CPT | Performed by: OBSTETRICS & GYNECOLOGY

## 2018-05-10 PROCEDURE — 76821 MIDDLE CEREBRAL ARTERY ECHO: CPT | Performed by: OBSTETRICS & GYNECOLOGY

## 2018-05-13 PROBLEM — O34.219 HISTORY OF CESAREAN DELIVERY, ANTEPARTUM: Status: ACTIVE | Noted: 2018-05-13

## 2018-05-24 ENCOUNTER — ANESTHESIA EVENT (INPATIENT)
Dept: LABOR AND DELIVERY | Facility: HOSPITAL | Age: 38
End: 2018-05-24
Payer: COMMERCIAL

## 2018-05-24 ENCOUNTER — HOSPITAL ENCOUNTER (OUTPATIENT)
Dept: LABOR AND DELIVERY | Facility: HOSPITAL | Age: 38
Discharge: HOME | End: 2018-05-24
Payer: COMMERCIAL

## 2018-05-24 ENCOUNTER — ANESTHESIA (INPATIENT)
Dept: LABOR AND DELIVERY | Facility: HOSPITAL | Age: 38
End: 2018-05-24
Payer: COMMERCIAL

## 2018-05-24 ENCOUNTER — HOSPITAL ENCOUNTER (INPATIENT)
Facility: HOSPITAL | Age: 38
LOS: 2 days | Discharge: HOME | End: 2018-05-26
Attending: OBSTETRICS & GYNECOLOGY | Admitting: OBSTETRICS & GYNECOLOGY
Payer: COMMERCIAL

## 2018-05-24 DIAGNOSIS — Z3A.39 39 WEEKS GESTATION OF PREGNANCY: Primary | ICD-10-CM

## 2018-05-24 LAB
ABO GROUP BLD: NORMAL
BASE EXCESS BLDCOA CALC-SCNC: -1.9 MMOL/L (ref 3–11)
BASE EXCESS BLDCOV CALC-SCNC: -1.2 MMOL/L (ref 1–9)
BLD GP AB SCN SERPL QL: NEGATIVE
ERYTHROCYTE [DISTWIDTH] IN BLOOD BY AUTOMATED COUNT: 14 % (ref 11.6–15.1)
HCO3 BLDCOA-SCNC: 24.3 MMOL/L (ref 17.3–27.3)
HCO3 BLDCOV-SCNC: 25.2 MMOL/L (ref 12.2–28.6)
HCT VFR BLD AUTO: 38.3 % (ref 34.8–46.1)
HGB BLD-MCNC: 12.8 G/DL (ref 11.5–15.4)
MCH RBC QN AUTO: 28.9 PG (ref 26.8–34.3)
MCHC RBC AUTO-ENTMCNC: 33.4 G/DL (ref 31.4–37.4)
MCV RBC AUTO: 87 FL (ref 82–98)
O2 CT VFR BLDCOA CALC: 11.5 ML/DL
OXYHGB MFR BLDCOA: 49.4 %
OXYHGB MFR BLDCOV: 53.3 %
PCO2 BLDCOA: 46.5 MM[HG] (ref 30–60)
PCO2 BLDCOV: 48.3 MM HG (ref 27–43)
PH BLDCOA: 7.34 [PH] (ref 7.23–7.43)
PH BLDCOV: 7.34 [PH] (ref 7.19–7.49)
PLATELET # BLD AUTO: 201 THOUSANDS/UL (ref 149–390)
PMV BLD AUTO: 9.8 FL (ref 8.9–12.7)
PO2 BLDCOA: 21.3 MM HG (ref 5–25)
PO2 BLDCOV: 22.1 MM HG (ref 15–45)
RBC # BLD AUTO: 4.43 MILLION/UL (ref 3.81–5.12)
RH BLD: POSITIVE
SAO2 % BLDCOV: 12.8 ML/DL
SPECIMEN EXPIRATION DATE: NORMAL
WBC # BLD AUTO: 7.15 THOUSAND/UL (ref 4.31–10.16)

## 2018-05-24 PROCEDURE — 10907ZC DRAINAGE OF AMNIOTIC FLUID, THERAPEUTIC FROM PRODUCTS OF CONCEPTION, VIA NATURAL OR ARTIFICIAL OPENING: ICD-10-PCS | Performed by: OBSTETRICS & GYNECOLOGY

## 2018-05-24 PROCEDURE — 3E033VJ INTRODUCTION OF OTHER HORMONE INTO PERIPHERAL VEIN, PERCUTANEOUS APPROACH: ICD-10-PCS | Performed by: OBSTETRICS & GYNECOLOGY

## 2018-05-24 PROCEDURE — 86901 BLOOD TYPING SEROLOGIC RH(D): CPT | Performed by: STUDENT IN AN ORGANIZED HEALTH CARE EDUCATION/TRAINING PROGRAM

## 2018-05-24 PROCEDURE — 85027 COMPLETE CBC AUTOMATED: CPT | Performed by: STUDENT IN AN ORGANIZED HEALTH CARE EDUCATION/TRAINING PROGRAM

## 2018-05-24 PROCEDURE — 86900 BLOOD TYPING SEROLOGIC ABO: CPT | Performed by: STUDENT IN AN ORGANIZED HEALTH CARE EDUCATION/TRAINING PROGRAM

## 2018-05-24 PROCEDURE — 82805 BLOOD GASES W/O2 SATURATION: CPT | Performed by: OBSTETRICS & GYNECOLOGY

## 2018-05-24 PROCEDURE — 86592 SYPHILIS TEST NON-TREP QUAL: CPT | Performed by: STUDENT IN AN ORGANIZED HEALTH CARE EDUCATION/TRAINING PROGRAM

## 2018-05-24 PROCEDURE — 86850 RBC ANTIBODY SCREEN: CPT | Performed by: STUDENT IN AN ORGANIZED HEALTH CARE EDUCATION/TRAINING PROGRAM

## 2018-05-24 RX ORDER — FENTANYL CITRATE 50 UG/ML
INJECTION, SOLUTION INTRAMUSCULAR; INTRAVENOUS AS NEEDED
Status: DISCONTINUED | OUTPATIENT
Start: 2018-05-24 | End: 2018-05-24 | Stop reason: SURG

## 2018-05-24 RX ORDER — IBUPROFEN 600 MG/1
600 TABLET ORAL EVERY 6 HOURS PRN
Status: DISCONTINUED | OUTPATIENT
Start: 2018-05-24 | End: 2018-05-26 | Stop reason: HOSPADM

## 2018-05-24 RX ORDER — METOCLOPRAMIDE HYDROCHLORIDE 5 MG/ML
5 INJECTION INTRAMUSCULAR; INTRAVENOUS EVERY 6 HOURS PRN
Status: DISCONTINUED | OUTPATIENT
Start: 2018-05-24 | End: 2018-05-24

## 2018-05-24 RX ORDER — DIAPER,BRIEF,INFANT-TODD,DISP
1 EACH MISCELLANEOUS AS NEEDED
Status: DISCONTINUED | OUTPATIENT
Start: 2018-05-24 | End: 2018-05-26 | Stop reason: HOSPADM

## 2018-05-24 RX ORDER — DOCUSATE SODIUM 100 MG/1
100 CAPSULE, LIQUID FILLED ORAL 2 TIMES DAILY
Status: DISCONTINUED | OUTPATIENT
Start: 2018-05-24 | End: 2018-05-26 | Stop reason: HOSPADM

## 2018-05-24 RX ORDER — SIMETHICONE 80 MG
80 TABLET,CHEWABLE ORAL 4 TIMES DAILY PRN
Status: DISCONTINUED | OUTPATIENT
Start: 2018-05-24 | End: 2018-05-26 | Stop reason: HOSPADM

## 2018-05-24 RX ORDER — FENTANYL CITRATE 50 UG/ML
INJECTION, SOLUTION INTRAMUSCULAR; INTRAVENOUS
Status: COMPLETED
Start: 2018-05-24 | End: 2018-05-24

## 2018-05-24 RX ORDER — LIDOCAINE HYDROCHLORIDE AND EPINEPHRINE 15; 5 MG/ML; UG/ML
INJECTION, SOLUTION EPIDURAL AS NEEDED
Status: DISCONTINUED | OUTPATIENT
Start: 2018-05-24 | End: 2018-05-24 | Stop reason: SURG

## 2018-05-24 RX ORDER — SENNOSIDES 8.6 MG
1 TABLET ORAL
Status: DISCONTINUED | OUTPATIENT
Start: 2018-05-24 | End: 2018-05-26 | Stop reason: HOSPADM

## 2018-05-24 RX ORDER — OXYCODONE HYDROCHLORIDE AND ACETAMINOPHEN 5; 325 MG/1; MG/1
2 TABLET ORAL EVERY 4 HOURS PRN
Status: DISCONTINUED | OUTPATIENT
Start: 2018-05-24 | End: 2018-05-26 | Stop reason: HOSPADM

## 2018-05-24 RX ORDER — ACETAMINOPHEN 325 MG/1
650 TABLET ORAL EVERY 4 HOURS PRN
Status: DISCONTINUED | OUTPATIENT
Start: 2018-05-24 | End: 2018-05-26 | Stop reason: HOSPADM

## 2018-05-24 RX ORDER — OXYTOCIN/RINGER'S LACTATE 30/500 ML
1-30 PLASTIC BAG, INJECTION (ML) INTRAVENOUS
Status: DISCONTINUED | OUTPATIENT
Start: 2018-05-24 | End: 2018-05-24

## 2018-05-24 RX ORDER — ONDANSETRON 2 MG/ML
4 INJECTION INTRAMUSCULAR; INTRAVENOUS EVERY 4 HOURS PRN
Status: DISCONTINUED | OUTPATIENT
Start: 2018-05-24 | End: 2018-05-24

## 2018-05-24 RX ORDER — SODIUM CHLORIDE, SODIUM LACTATE, POTASSIUM CHLORIDE, CALCIUM CHLORIDE 600; 310; 30; 20 MG/100ML; MG/100ML; MG/100ML; MG/100ML
125 INJECTION, SOLUTION INTRAVENOUS CONTINUOUS
Status: DISCONTINUED | OUTPATIENT
Start: 2018-05-24 | End: 2018-05-24

## 2018-05-24 RX ORDER — OXYCODONE HYDROCHLORIDE AND ACETAMINOPHEN 5; 325 MG/1; MG/1
1 TABLET ORAL EVERY 4 HOURS PRN
Status: DISCONTINUED | OUTPATIENT
Start: 2018-05-24 | End: 2018-05-26 | Stop reason: HOSPADM

## 2018-05-24 RX ORDER — DIPHENHYDRAMINE HYDROCHLORIDE 50 MG/ML
25 INJECTION INTRAMUSCULAR; INTRAVENOUS EVERY 6 HOURS PRN
Status: DISCONTINUED | OUTPATIENT
Start: 2018-05-24 | End: 2018-05-24

## 2018-05-24 RX ORDER — ONDANSETRON 2 MG/ML
4 INJECTION INTRAMUSCULAR; INTRAVENOUS EVERY 6 HOURS PRN
Status: DISCONTINUED | OUTPATIENT
Start: 2018-05-24 | End: 2018-05-24

## 2018-05-24 RX ORDER — BUPIVACAINE HYDROCHLORIDE 2.5 MG/ML
INJECTION, SOLUTION INFILTRATION; PERINEURAL AS NEEDED
Status: DISCONTINUED | OUTPATIENT
Start: 2018-05-24 | End: 2018-05-24 | Stop reason: SURG

## 2018-05-24 RX ADMIN — SODIUM CHLORIDE, SODIUM LACTATE, POTASSIUM CHLORIDE, AND CALCIUM CHLORIDE 125 ML/HR: .6; .31; .03; .02 INJECTION, SOLUTION INTRAVENOUS at 08:34

## 2018-05-24 RX ADMIN — BUPIVACAINE HYDROCHLORIDE 1 ML: 2.5 INJECTION, SOLUTION INFILTRATION; PERINEURAL at 11:27

## 2018-05-24 RX ADMIN — IBUPROFEN 600 MG: 600 TABLET ORAL at 23:49

## 2018-05-24 RX ADMIN — ROPIVACAINE HYDROCHLORIDE: 5 INJECTION, SOLUTION EPIDURAL; INFILTRATION; PERINEURAL at 11:30

## 2018-05-24 RX ADMIN — FENTANYL CITRATE 10 MCG: 50 INJECTION, SOLUTION INTRAMUSCULAR; INTRAVENOUS at 11:27

## 2018-05-24 RX ADMIN — SODIUM CHLORIDE 2.5 MILLION UNITS: 9 INJECTION, SOLUTION INTRAVENOUS at 12:58

## 2018-05-24 RX ADMIN — SODIUM CHLORIDE, SODIUM LACTATE, POTASSIUM CHLORIDE, AND CALCIUM CHLORIDE 125 ML/HR: .6; .31; .03; .02 INJECTION, SOLUTION INTRAVENOUS at 11:09

## 2018-05-24 RX ADMIN — Medication 2 MILLI-UNITS/MIN: at 08:38

## 2018-05-24 RX ADMIN — DOCUSATE SODIUM 100 MG: 100 CAPSULE, LIQUID FILLED ORAL at 18:07

## 2018-05-24 RX ADMIN — BENZOCAINE AND LEVOMENTHOL: 200; 5 SPRAY TOPICAL at 18:06

## 2018-05-24 RX ADMIN — SODIUM CHLORIDE 5 MILLION UNITS: 9 INJECTION, SOLUTION INTRAVENOUS at 08:38

## 2018-05-24 RX ADMIN — LIDOCAINE HYDROCHLORIDE AND EPINEPHRINE 5 ML: 15; 5 INJECTION, SOLUTION EPIDURAL at 13:33

## 2018-05-24 NOTE — OB LABOR/OXYTOCIN SAFETY PROGRESS
Oxytocin Safety Progress Check Note - Rosalba Res 44 y o  female MRN: [id number]    Unit/Bed#: -01 Encounter: [id number]    Obstetric History       T2      L2     SAB0   TAB0   Ectopic0   Multiple0   Live Births2      Gestational Age: 39w5d  Dose (stephanie-units/min) Oxytocin: 8 stephanie-units/min  Contraction Frequency (minutes): 3  Contraction Quality: Moderate  Tachysystole: No   Dilation: 4        Effacement (%): 80  Station: -2  Baseline Rate: 140 bpm  Fetal Heart Rate: 141 BPM  FHR Category: Category I          Notes/comments:   Pt getting more uncomfortable, requesting epidural  Making good cervical change  FHT Cat I, cont to monitor  Cont pitocin titration  Recheck in 1-2hr, sooner if uncomfortable          Renita Rockwell DO 2018 10:44 AM

## 2018-05-24 NOTE — ANESTHESIA PREPROCEDURE EVALUATION
Review of Systems/Medical History  Patient summary reviewed  Chart reviewed  No history of anesthetic complications     Cardiovascular  Negative cardio ROS    Pulmonary  Asthma , well controlled/ stable ,        GI/Hepatic  Negative GI/hepatic ROS          Negative  ROS        Endo/Other  Negative endo/other ROS      GYN  Currently pregnant , Prior pregnancy/OB history : 3 Parity: 2,     Comment: Hx of PCOS  Hx of c/s x1,  x1     Hematology  Negative hematology ROS      Musculoskeletal  Negative musculoskeletal ROS        Neurology  Negative neurology ROS      Psychology   Negative psychology ROS              Physical Exam    Airway    Mallampati score: II  TM Distance: >3 FB  Neck ROM: full     Dental   No notable dental hx     Cardiovascular  Comment: Negative ROS, Rhythm: regular, Rate: normal, Cardiovascular exam normal    Pulmonary  Pulmonary exam normal Breath sounds clear to auscultation,     Other Findings        Anesthesia Plan  ASA Score- 2     Anesthesia Type- epidural and spinal with ASA Monitors  Additional Monitors:   Airway Plan:         Plan Factors-    Induction- intravenous  Postoperative Plan-     Informed Consent- Anesthetic plan and risks discussed with patient  I personally reviewed this patient with the CRNA  Discussed and agreed on the Anesthesia Plan with the CRNA  Klaudia Jeffries Recent labs personally reviewed:  Lab Results   Component Value Date    WBC 7 15 2018    HGB 12 8 2018     2018     Lab Results   Component Value Date     10/28/2015    K 3 6 10/28/2015    BUN 17 10/28/2015    CREATININE 0 65 10/28/2015    GLUCOSE 76 10/28/2015     No results found for: PTT   No results found for: INR    Blood type O    Lab Results   Component Value Date    HGBA1C 5 1 10/28/2015       I, Kathryn Priest MD, have personally seen and evaluated the patient prior to anesthetic care    I have reviewed the pre-anesthetic record, and other medical records if appropriate to the anesthetic care  If a CRNA is involved in the case, I have reviewed the CRNA assessment, if present, and agree  Risks/benefits and alternatives discussed with patient including possible PONV, sore throat, and possibility of rare anesthetic and surgical emergencies

## 2018-05-24 NOTE — ANESTHESIA POSTPROCEDURE EVALUATION
Post-Op Assessment Note      CV Status:  Stable    Mental Status:  Alert and awake    Hydration Status:  Stable    PONV Controlled:  None    Airway Patency:  Patent    Post Op Vitals Reviewed: Yes        Post-op block assessment: adhesive bandage applied, site cleaned, no complications and catheter intact        BP      Temp      Pulse     Resp      SpO2

## 2018-05-24 NOTE — OB LABOR/OXYTOCIN SAFETY PROGRESS
Oxytocin Safety Progress Check Note - Maudry Canal 44 y o  female MRN: [id number]    Unit/Bed#: -01 Encounter: [id number]    Obstetric History       T2      L2     SAB0   TAB0   Ectopic0   Multiple0   Live Births2      Gestational Age: 39w5d  Dose (stephanie-units/min) Oxytocin: 12 stephanie-units/min  Contraction Frequency (minutes): 3-4  Contraction Quality: Moderate  Tachysystole: No   Dilation: (Comment), 10  Dilation Complete Date: 18  Dilation Complete Time: 1405  Effacement (%): 100  Station: 3  Baseline Rate: 130 bpm  Fetal Heart Rate: 135 BPM  FHR Category: Category I          Notes/comments:   Pt comfortable with epidural  Making excellent cervical change   Complete as above  FHT Cat I with early decelerations  Dr Tejas Gill aware and en route          Fern Roa DO 2018 2:12 PM

## 2018-05-24 NOTE — L&D DELIVERY NOTE
Delivery Summary - OB/GYN   Mundo Dunbar 44 y o  female MRN: [id number]  Unit/Bed#: -01 Encounter: [id number]    Pre-delivery Diagnosis:   1  39w5d pregnancy  2  Induced labor  3  GBS positive  4  Advanced maternal age  11  Hx  section    Post-delivery Diagnosis: same, delivered    Attending: Dr Colten Nino    Assistant(s): Dr Erendira Adame    Procedure:     Anesthesia: Epidural    Estimated Blood Loss:  300 mL    Specimens:   1  Arterial and venous cord gases  2  Cord blood  3  Segment of umbilical cord  4  Placenta to storage     Complications:  None apparent    Findings:  1  Viable female  delivered at 1421 weight pending;  Apgar scores of 9 at one minute and 9 at five minutes  2  Spontaneous delivery of placenta with 3-vessel cord  3  Clear amniotic fluid  4  No lacerations  5  Arterial cord pH 7 336, base excess -1 9  6  Venous cord pH 7 336, base excess -1 2       Disposition: Patient tolerated the procedure well and was recovering in labor and delivery room with family and  before being transferred to the post-partum floor  Procedure Details     Description of procedure  Amira Guardado is a 38yo G3 now P3 who initially presented for scheduled induction of labor  She was 2-3cm dilated upon arrival  She received pitocin titration  She received epidural anesthesia  She underwent amniotomy  She then progressed rapidly, reaching complete at 1405 and pushing at 1421  After pushing for less than 1 minute, at 1421 patient delivered a viable female , weight pending, Apgars of 9 (1 min) and 9 (5 min)  The fetal vertex delivered spontaneously  There was no nuchal cord  The anterior shoulder delivered atraumatically with maternal expulsive forces and the assistance of downward traction  The posterior shoulder delivered with maternal expulsive forces and the assistance of upward traction  The remainder of the fetus delivered spontaneously       Upon delivery, the infant was placed on the mothers abdomen and the cord was clamped and cut  The infant was noted to cry spontaneously and was moving all extremities appropriately  There was no evidence for injury  Awaiting nurse resuscitators evaluated the  at bedside  A cord blood collection kit was used and an adequate sample was retrieved  Arterial and venous cord blood gases and cord blood was collected for analysis; these were difficult to obtain secondary to the cord blood collection kit  These were promptly sent to the lab  In the immediate post-partum, 30 units of IV pitocin was administered and the uterus was noted to contract down well with massage and pitocin  The placenta delivered spontaneously at 1429 and was noted to have a 3 vessel cord  The vagina, cervix, and perineum were inspected and there was noted to be a small periurethral abrasion that did not require repair  At the conclusion of the delivery, all needle, sponge, and instrument counts were noted to be correct  Patient tolerated the procedure well and was allowed to recover in labor and delivery room with family and  before being transferred to the post-partum floor  Dr Viktoriya Gerardo was present and participated in all key portions of the case        Jed Vann DO  OB/GYN, PGY2  2018, 3:13 PM

## 2018-05-24 NOTE — DISCHARGE SUMMARY
Discharge Summary -   Ramírez Choi 44 y o  female MRN: [id number]  Unit/Bed#: -01 Encounter: [id number]    Admission Date: 2018     Discharge Date: 18    Delivering Attending: Gustavo Landaverde MD  Discharging Attending: Daly Urena    Principal Diagnosis:   Term pregnancy  Induced labor  Single fetus  Uncomplicated pregnancy  GBS positive  Hx  section  Advanced maternal age    Secondary Diagnosis:   Same as above, delivered    Procedures: Spontaneous vaginal delivery    Hospital Course: Ramírez Choi is a 44 y o   at 39w5d who was initially admitted for elective induction of labor  She was 2-3cm dilated upon arrival  She received pitocin titration  She received epidural anesthesia  She underwent amniotomy  She then progressed rapidly, reaching complete at 1405 and pushing at 1421  She delivered a viable female  on 18 at 65  Weight 7lbs 5 8oz via normal spontaneous vaginal delivery  She sustained no laceration during delivery  Apgars were 9 (1 min) and 9 (5 min)   was transferred to  nursery  Patient tolerated the procedure well  Her post-delivery course was uncomplicated  Her postpartum pain was well controlled with oral analgesics  On day of discharge, she was ambulating and able to reasonably perform all ADLs  She was voiding and had appropriate bowel function  Pain was well controlled  She was discharged home on postpartum day #2 without complications  Patient was instructed to follow up with her OB as an outpatient and was given appropriate warnings to call provider if she develops signs of infection or uncontrolled pain  Complications: None,    Condition at discharge: stable     Discharge Medications: For a complete list of the patient's medications, please refer to her medical reconcillation report  Discharge instructions/Information to patient and family:   See after visit summary for information provided to patient and family  Provisions for Follow-Up Care:  See after visit summary for information related to follow-up care and any pertinent home health orders  Disposition: See After Visit Summary for discharge disposition information      Planned Readmission: No

## 2018-05-24 NOTE — OB LABOR/OXYTOCIN SAFETY PROGRESS
Oxytocin Safety Progress Check Note - Griffin Herrera 44 y o  female MRN: [id number]    Unit/Bed#: -01 Encounter: [id number]    Obstetric History       T2      L2     SAB0   TAB0   Ectopic0   Multiple0   Live Births2      Gestational Age: 39w5d  Dose (stephanie-units/min) Oxytocin: 10 stephanie-units/min  Contraction Frequency (minutes): 2-3  Contraction Quality: Moderate  Tachysystole: No   Dilation: 4-5        Effacement (%): 80  Station: -2  Baseline Rate: 140 bpm  Fetal Heart Rate: 140 BPM  FHR Category: Category I          Notes/comments:   Pt comfortable now with epidural  Making small amounts of cervical change  AROM clear fluid at 1240  FHT Cat I, cont to monitor  Cont pitocin titration  Recheck in 1-2hr, sooner if uncomfortable    Discussed with Dr Shelly Tolbert DO 2018 12:44 PM

## 2018-05-24 NOTE — H&P
H&P Exam - Obstetrics   Andres Cotter 44 y o  female MRN: [id number]  Unit/Bed#: -01 Encounter: [id number]      History of Present Illness     Chief Complaint: Induction of labor    HPI:  Andres Cotter is a 44 y o   female with an VICKY of 2018, by Ultrasound at 39w5d weeks gestation who is being admitted for elective IOL  Contractions: irregular  Vaginal Bleeding:no  Loss of Fluid:no  Fetal Movement:yes    PREGNANCY COMPLICATIONS: TOLAC, (prior successful )    OB History    Para Term  AB Living   3 2 2 0 0 2   SAB TAB Ectopic Multiple Live Births         0 2      # Outcome Date GA Lbr Wilver/2nd Weight Sex Delivery Anes PTL Lv   3 Current            2 Term 16 40w0d / 03:19 3160 g (6 lb 15 5 oz) M Vag-Spont EPI  ERASMO   1 Term /13 40w0d  4111 g (9 lb 1 oz) M CS-Unspec EPI  ERASMO          Baby complications/comments: none    Review of Systems   Constitutional: Negative for chills and fever  Eyes: Negative for visual disturbance  Respiratory: Negative for shortness of breath  Cardiovascular: Negative for chest pain  Gastrointestinal: Negative for constipation, diarrhea, nausea and vomiting  Genitourinary: Negative for pelvic pain, urgency, vaginal bleeding, vaginal discharge and vaginal pain  Neurological: Negative for headaches         Historical Information   Past Medical History:   Diagnosis Date    Abnormal Pap smear of cervix     Asthma     childhood    History of PCOS     HPV in female     Varicella     childhood     Past Surgical History:   Procedure Laterality Date     SECTION      COLPOSCOPY      FOOT SURGERY Right     NOSE SURGERY       Social History   History   Alcohol Use No     History   Drug Use No     History   Smoking Status    Never Smoker   Smokeless Tobacco    Never Used     Family History: non-contributory    Meds/Allergies    {  Prescriptions Prior to Admission   Medication    Cholecalciferol (VITAMIN D) 2000 UNITS tablet    Docosahexaenoic Acid (DHA PO)    folic acid (FOLVITE) 1 mg tablet    Prenatal Vit-Fe Fumarate-FA (PRENATAL VITAMIN PO)        Allergies   Allergen Reactions    Seasonal Ic [Cholestatin] Allergic Rhinitis       OBJECTIVE:    Vitals:   LMP  (LMP Unknown)   There is no height or weight on file to calculate BMI  Physical Exam   Constitutional: She is oriented to person, place, and time  She appears well-developed and well-nourished  Cardiovascular: Normal rate and regular rhythm  Pulmonary/Chest: Effort normal and breath sounds normal    Abdominal: Bowel sounds are normal  There is no tenderness  There is no rebound and no guarding  Musculoskeletal: Normal range of motion  She exhibits no tenderness  Neurological: She is alert and oriented to person, place, and time  Psychiatric: She has a normal mood and affect  Nursing note and vitals reviewed  SVE: Dilation: 2-3  Effacement (%): 60  Station: -2  Presentation: Vertex  Position: Unknown  Method: Manual  OB Examiner: Adama    FHT:  Baseline Rate: 140 bpm  TOCO:   Contraction Frequency (minutes): irregular  Contraction Duration (seconds): n/a  Contraction Quality: Mild      Prenatal Labs:   Blood type: O positive  Antigen Screen: negative  Rubella: Immune  HIV: Negative  RPR: NR  Hep B: negative  Diabetes Screen: 88  GBS: positive      Invasive Devices     Peripheral Intravenous Line            Peripheral IV 18 Left Wrist less than 1 day          Intrauterine Pressure Catheter            Intrauterine Pressure Catheter 16 1522 629 days                Assessment/Plan     ASSESSMENT:   IUP at 39w5d weeks gestation admitted for elective IOL  PLAN:   1) Admit   2) CBC, RPR, Blood Type   3) Analgesia and/or epidural at patient request   4) Anticipate    5) Start pit titration   6) D/W Dr Janice Cummins      This patient will be an INPATIENT  and I certify the anticipated length of stay is >2 Midnights        Ægissidu 64, MD  5/24/2018  8:34 AM

## 2018-05-24 NOTE — ANESTHESIA PROCEDURE NOTES
CSE Block    Patient location during procedure: OB  Start time: 5/24/2018 11:27 AM  Reason for block: procedure for pain and at surgeon's request  Staffing  Anesthesiologist: Imtiaz Connell  Performed: anesthesiologist   Preanesthetic Checklist  Completed: patient identified, site marked, surgical consent, pre-op evaluation, timeout performed, IV checked, risks and benefits discussed and monitors and equipment checked  CSE  Patient position: sitting  Prep: ChloraPrep  Patient monitoring: cardiac monitor and continuous pulse ox  Approach: midline  Spinal Needle  Needle type: pencil-tip   Needle gauge: 27 G  Needle length: 10 cm  Epidural Needle  Injection technique: SETH saline  Needle type: Tuohy   Needle gauge: 18 G  Location: lumbar (1-5)  Catheter  Catheter type: side hole  Catheter size: 20 G  Catheter at skin depth: 10 cm  Test dose: negative  Assessment  Injection Assessment:  negative aspiration for heme, no paresthesia on injection, positive aspiration for clear CSF and no pain on injection

## 2018-05-25 LAB — RPR SER QL: NORMAL

## 2018-05-25 RX ADMIN — IBUPROFEN 600 MG: 600 TABLET ORAL at 11:48

## 2018-05-25 RX ADMIN — IBUPROFEN 600 MG: 600 TABLET ORAL at 19:34

## 2018-05-25 RX ADMIN — DOCUSATE SODIUM 100 MG: 100 CAPSULE, LIQUID FILLED ORAL at 19:34

## 2018-05-25 RX ADMIN — IBUPROFEN 600 MG: 600 TABLET ORAL at 06:38

## 2018-05-25 RX ADMIN — DOCUSATE SODIUM 100 MG: 100 CAPSULE, LIQUID FILLED ORAL at 08:04

## 2018-05-25 NOTE — PROGRESS NOTES
Progress Note - OB/GYN   India Sosa 44 y o  female MRN: [id number]  Unit/Bed#: -01 Encounter: [id number]    Assessment:  Term pregnancy, s/p , PPD#1    Plan:  Continue routine postpartum care  Encourage ambulation  Encourage increased PO water intake  Pain control as needed  Pt may desire early discharge to home    Subjective/Objective   Chief Complaint: I'm fine    Subjective: Pt reports feeling well today  Ambulating  Tolerating PO  Voiding without difficulty  No BM yet but passing flatus  Lochia improving  Denies HA, CP, SOB, extremity pain  Objective:   Vitals: Blood pressure 105/52, pulse 61, temperature 98 3 °F (36 8 °C), temperature source Oral, resp  rate 18, height 5' 8" (1 727 m), weight 80 3 kg (177 lb), SpO2 98 %, currently breastfeeding  ,Body mass index is 26 91 kg/m²  Intake/Output Summary (Last 24 hours) at 18 0639  Last data filed at 18   Gross per 24 hour   Intake          1516 67 ml   Output             2750 ml   Net         -1233 33 ml       Invasive Devices          No matching active lines, drains, or airways          Physical Exam:   Gen: AaOx3, NAD, pleasant  Pulm: No increased work of breathing  Abd: Soft, nontender, nondistended  : Fundus firm, appropriately tender, located at umbilicus  Extremities: No edema, nontender, Negative Haider's bilaterally      Lab, Imaging and other studies: I have personally reviewed pertinent reports              Graham Montgomery DO  OB/GYN, PGY2  2018, 6:40 AM

## 2018-05-25 NOTE — LACTATION NOTE
This note was copied from a baby's chart  CONSULT - LACTATION  Baby Girl  (Maryann Jesus 0 days female MRN: [id number]    [facility] Room / Bed: (N)/ 302(N) Encounter: [id number]    Maternal Information     MOTHER:  Amira Jesus  Maternal Age: 44 y o    OB History: #: 1, Date: 13, Sex: Male, Weight: 4111 g (9 lb 1 oz), GA: 40w0d, Delivery: , Unspecified, Apgar1: None, Apgar5: None, Living: Living, Birth Comments: None    #: 2, Date: 16, Sex: Male, Weight: 3160 g (6 lb 15 5 oz), GA: 40w0d, Delivery: Vaginal, Spontaneous Delivery, Apgar1: 3, Apgar5: 8, Living: Living, Birth Comments: None    #: 3, Date: 18, Sex: Female, Weight: 3340 g (7 lb 5 8 oz), GA: 39w5d, Delivery: Vaginal, Spontaneous Delivery, Apgar1: 9, Apgar5: 9, Living: Living, Birth Comments: None   Previouse breast reduction surgery? No    Lactation history:   Has patient previously breast fed: Yes   How long had patient previously breast fed: 1 year each for two kids   Previous breast feeding complications:       Past Surgical History:   Procedure Laterality Date     SECTION      COLPOSCOPY      FOOT SURGERY Right     NOSE SURGERY         Birth information:  YOB: 2019   Time of birth: 2:21 PM   Sex: female   Delivery type: Vaginal, Spontaneous Delivery   Birth Weight: 3340 g (7 lb 5 8 oz)   Percent of Weight Change: 0%     Gestational Age: 38w11d   [unfilled]    Assessment     Breast and nipple assessment: normal assessment    Mellette Assessment: sleepy    Feeding assessment: no latch  LATCH:  Latch: Too sleepy or reluctant, no latch achieved   Audible Swallowing: None   Type of Nipple: Everted (After stimulation)   Comfort (Breast/Nipple): Soft/non-tender   Hold (Positioning): No assist from staff, mother able to position/hold infant   LATCH Score: 6          Feeding recommendations:  breast feed on demand     Met with mother  Provided mother with Ready, Set, Baby booklet  Discussed Skin to Skin contact an benefits to mom and baby  Talked about the delay of the first bath until baby has adjusted  Spoke about the benefits of rooming in  Feeding on cue and what that means for recognizing infant's hunger  Avoidance of pacifiers for the first month discussed  Talked about exclusive breastfeeding for the first 6 months  Positioning and latch reviewed as well as showing images of other feeding positions  Discussed the properties of a good latch in any position  Reviewed hand/manual expression  Discussed s/s that baby is getting enough milk and some s/s that breastfeeding dyad may need further help  Gave information on common concerns, what to expect the first few weeks after delivery, preparing for other caregivers, and how partners can help  Resources for support also provided  Information on hand expression given  Discussed benefits of knowing how to manually express breast including stimulating milk supply, softening nipple for latch and evacuating breast in the event of engorgement  Spent time working on different positions that would facilitate better transfer of breastmilk  Encoraged MOB and FOB to call for assistance, questions and concerns  Extension number for inpatient lactation support provided      Desmond Vazquez RN 5/24/2018 8:04 PM

## 2018-05-25 NOTE — LACTATION NOTE
This note was copied from a baby's chart  Met with mother to go over feeding log since birth for the first week  Emphasized 8 or more (12) feedings in a 24 hour period, what to expect for the number of diapers per day of life and the progression of properties of the  stooling pattern  Discussed s/s that breastfeeding is going well after day 4 and when to get help from a pediatrician or lactation support person after day 4  Booklet included Breast Pumping Instructions, When You Go Back to Work or School, and Breastfeeding Resources for after discharge including access to the number for the [employer]  Mom experienced with BF  Positions infant independently and aware of how to achieve a deep latch  Infant holds tongue to the root of her mouth and is very eager to begin sucking, difficult to elicit gape response  Mom also concerned she has not seen any milk from her breasts despite pumping and hand expression  I tried to express milk from her at this time but also did not see any  She reports having no supply issues with her other children (BF 1 year each)  Enc to contact OB or [facility] center if she continues to not see any milk within 3-5 days  SNS started at the breast, infant achieves a shallow latch but stays on the breast  Mom given nipple shield per request and education on how to use in order to help infant to relax her tongue and hold a latch  Discussed SNS at the breast if she decides to try the shield and also the need to pump if she doesn't feel her breasts are being adequately stimulated  Enc to call for assistance with any feeds as needed PTD

## 2018-05-25 NOTE — PROGRESS NOTES
Called In by pt at 0330 expressing concern regarding the baby's inability to latch and that baby has not eaten since birth (12+ hrs ago)  Pt has tried pumping and hand expression and is not producing anything at this time, baby has shown no progress on latching to breast feed  Discussed options with formula vs donor breast milk  Also discussed feeding options re: SNS, syringe, cup, and bottle  At this time, pt expressed wanting to go with bottle of formula to ensure she is simply able to suck and eat and will continue to attempt to latch with each feed  Discussed with pt to continue pumping q2-3hrs for 20 min at a time until milk comes in and/or baby latches

## 2018-05-26 VITALS
TEMPERATURE: 98.4 F | RESPIRATION RATE: 18 BRPM | HEART RATE: 66 BPM | HEIGHT: 68 IN | SYSTOLIC BLOOD PRESSURE: 115 MMHG | DIASTOLIC BLOOD PRESSURE: 72 MMHG | BODY MASS INDEX: 26.83 KG/M2 | OXYGEN SATURATION: 98 % | WEIGHT: 177 LBS

## 2018-05-26 RX ADMIN — IBUPROFEN 600 MG: 600 TABLET ORAL at 09:38

## 2018-05-26 RX ADMIN — DOCUSATE SODIUM 100 MG: 100 CAPSULE, LIQUID FILLED ORAL at 09:37

## 2018-05-26 NOTE — PROGRESS NOTES
Progress Note - OB/GYN   Kris Moffett 44 y o  female MRN: [id number]  Unit/Bed#: -01 Encounter: [id number]    Assessment:  Post partum Day #2 s/p , stable    Plan:  Continue routine post partum care  Encourage ambulation  Encourage breastfeeding  Anticipate discharge today    Subjective/Objective   Chief Complaint:     Post delivery    Subjective:     Pain: yes, cramping, improved with meds  Tolerating PO: yes  Voiding: yes  Flatus: yes  BM: no  Ambulating: yes  Breastfeeding:  Yes  Chest pain: no  Shortness of breath: no  Leg pain: no  Lochia: minimal    Objective:     Vitals: /71 (BP Location: Right arm)   Pulse 67   Temp 98 5 °F (36 9 °C) (Oral)   Resp 18   Ht 5' 8" (1 727 m)   Wt 80 3 kg (177 lb)   LMP  (LMP Unknown)   SpO2 98%   Breastfeeding?  Yes   BMI 26 91 kg/m²     No intake or output data in the 24 hours ending 18 0659    Lab Results   Component Value Date    WBC 7 15 2018    HGB 12 8 2018    HCT 38 3 2018    MCV 87 2018     2018       Current Facility-Administered Medications   Medication Dose Route Frequency    acetaminophen (TYLENOL) tablet 650 mg  650 mg Oral Q4H PRN    benzocaine-menthol-lanolin-aloe (DERMOPLAST) 20-0 5 % topical spray   Topical 4x Daily PRN    docusate sodium (COLACE) capsule 100 mg  100 mg Oral BID    hydrocortisone 1 % cream 1 application  1 application Topical PRN    ibuprofen (MOTRIN) tablet 600 mg  600 mg Oral Q6H PRN    magnesium hydroxide (MILK OF MAGNESIA) 400 mg/5 mL oral suspension 30 mL  30 mL Oral Daily PRN    measles-mumps-rubella (M-M-R II) subcutaneous injection 0 5 mL  0 5 mL Subcutaneous PRN    oxyCODONE-acetaminophen (PERCOCET) 5-325 mg per tablet 1 tablet  1 tablet Oral Q4H PRN    oxyCODONE-acetaminophen (PERCOCET) 5-325 mg per tablet 2 tablet  2 tablet Oral Q4H PRN    senna (SENOKOT) tablet 8 6 mg  1 tablet Oral HS PRN    simethicone (MYLICON) chewable tablet 80 mg  80 mg Oral 4x Daily PRN    tetanus-diphtheria-acellular pertussis (BOOSTRIX) IM injection 0 5 mL  0 5 mL Intramuscular PRN    witch hazel-glycerin (TUCKS) topical pad 1 pad  1 pad Topical PRN       Physical Exam:     Gen: AAOx3, NAD  CV: RRR  Lungs: CTA b/l  Abd: Soft, non-tender, non-distended, no rebound or guarding  Uterine fundus firm and non-tender, 2 cm below umbilicus  Ext: Non tender    Slade Slade, PGY-1  OB/GYN  5/26/2018  6:59 AM

## 2018-05-29 ENCOUNTER — OFFICE VISIT (OUTPATIENT)
Dept: POSTPARTUM | Facility: CLINIC | Age: 38
End: 2018-05-29

## 2018-05-29 VITALS — SYSTOLIC BLOOD PRESSURE: 134 MMHG | DIASTOLIC BLOOD PRESSURE: 78 MMHG

## 2018-05-29 DIAGNOSIS — Z91.89 BREASTFEEDING PROBLEM: ICD-10-CM

## 2018-05-29 NOTE — PROGRESS NOTES
INITIAL BREAST FEEDING EVALUATION    Informant/Relationship: Amira    Discussion of General Lactation Issues: Unable to nurse since the baby's birth  The baby has never latched and Eugenie Espinoza says she didn't even see colostrum until the last day or two  Infant is 11days old today   History:  Fertility Problem:yes - PCOS, but no issues once she went vegan  Breast changes:no  : yes -   Full term:yes - FT   labor:no  First nursing/attempt < 1 hour after birth:yes - first opportunity was immediately, though baby didn't latch  Skin to skin following delivery:yes - immediately  Breast changes after delivery:yes - but didn't even see colostrum until late  Rooming in (infant in room with mother with exception of procedures, eg  Circumcision: yes - stayed with mom  Blood sugar issues:no  NICU stay:no  Jaundice:no  Phototherapy:no  Supplement given: (list supplement and method used as well as reason(s):yes - soy based formula    Past Medical History:   Diagnosis Date    Abnormal Pap smear of cervix     Asthma     childhood    History of PCOS     HPV in female     Varicella     childhood         Current Outpatient Prescriptions:     Cholecalciferol (VITAMIN D) 2000 UNITS tablet, Take 2,000 Units by mouth daily  , Disp: , Rfl:     Docosahexaenoic Acid (DHA PO), Take 1 tablet by mouth daily  , Disp: , Rfl:     folic acid (FOLVITE) 1 mg tablet, Take 1 mg by mouth daily  , Disp: , Rfl:     Prenatal Vit-Fe Fumarate-FA (PRENATAL VITAMIN PO), Take 1 tablet by mouth daily  , Disp: , Rfl:     Allergies   Allergen Reactions    Seasonal Ic [Cholestatin] Allergic Rhinitis       History   Drug Use No       Social History     Interval Breastfeeding History:    Frequency of breast feeding: EBM and formula via bottle every 3 hours  Does mother feel breastfeeding is effective: No  Does infant appear satisfied after nursing:No  Stooling pattern normal: lYes  Urinating frequently:Yes  Using shield or shells: Yes, tried shield w/o improvement    Alternative/Artificial Feedings:   Bottle: Yes, EBM and occasional formula  Cup: N/A  Syringe/Finger: N/A           Formula Type: Soy based                     Amount: 2 oz            Breast Milk:                      Amount: 2 oz            Frequency Q 2-3 Hr between feedings  Elimination Problems: No      Equipment:  Nipple Shield             Type: n/a             Size: n/a             Frequency of Use: n/a  Pump            Type: Medela Freestyle            Frequency of Use: Every 3 hours  Shells            Type: n/a            Frequency of use: n/a    Equipment Problems: yes, trying to figure out the [facility] Pump      Mom:  Breast: Not performed today  Nipple Assessment in General: Normal: elongated/eraser, no discoloration and no damage noted  Mother's Awareness of Feeding Cues                 Recognizes: Yes                  Verbalizes: Yes  Support System: Yes  History of Breastfeeding: Nursed her two older children for a year each  Changes/Stressors/Violence: Inability to get the baby to latch  Concerns/Goals: Amira would like to nurse until she returns to work and then both nurse and provide EBM for [facility]    Problems with Mom: None    Physical Exam   Constitutional: She is oriented to person, place, and time  She appears well-developed and well-nourished  Neck: Normal range of motion  Neck supple  No thyromegaly present  Cardiovascular: Normal rate, regular rhythm, normal heart sounds and intact distal pulses  No murmur heard  Pulmonary/Chest: Effort normal and breath sounds normal    Musculoskeletal: Normal range of motion  She exhibits no tenderness  Lymphadenopathy:     She has no cervical adenopathy  She has no axillary adenopathy  Neurological: She is alert and oriented to person, place, and time  Skin: No erythema  Psychiatric: She has a normal mood and affect   Her behavior is normal  Judgment and thought content normal  Infant:  Behaviors: Alert  Color: Pale  Birth weight: 3 34kg  Current weight: 3 13kg    Problems with infant: tongue tied      General Appearance:  Alert, active, no distress                            Head:  Normocephalic, AFOF, sutures opposed                            Eyes:   Conjunctiva clear, no drainage                            Ears:   Normally placed, no anomolies                           Nose:   Septum intact, no drainage or erythema                          Mouth:  No lesions: palate is high and arched and tongue does not extend, but rather the tip curls upward to lift; there is a loud clicking while the infant is sucking on examiner's finger                   Neck:  Supple, symmetrical, trachea midline, no adenopathy; Respiratory:  No grunting, flaring, retractions, breath sounds clear and equal           Cardiovascular:  Regular rate and rhythm  No murmur  Adequate perfusion/capillary refill  Femoral pulse present                  Abdomen:    Soft, non-tender, no masses, bowel sounds present, no HSM            Genitourinary:  Normal female genitalia, anus patent                         Spine:   No abnormalities noted       Musculoskeletal:   Full range of motion         Skin/Hair/Nails:   Skin warm, dry, and intact, no rashes or abnormal dyspigmentation or lesions               Neurologic:   No abnormal movement, tone appropriate for gestational age    [place] Latch:  Efficiency:               Lips Flanged: No, even after frenotomy, the baby grasps just the nipple              Depth of latch: Shallow              Audible Swallow: No              Visible Milk: No              Wide Open/ Asymmetrical: No              Suck Swallow Cycle: Breathing: Unlabored, Coordinated: Yes  Nipple Assessment after latch: Normal: elongated/eraser, no discoloration and no damage noted  Latch Problems:  Even after the frenotomy, Aida clicks and does not like to latch widely   Manolo Odell may be coaxed to take a wider latch and takes a few sucks, but quickly pulls back onto just the nipple and begins clicking agagin  Position:  Infant's Ergonomics/Body               Body Alignment: Yes               Head Supported: Yes               Close to Mom's body/ Lifted/ Supported: Yes               Mom's Ergonomics/Body: Yes                           Supported: Yes                           Sitting Back: Yes                           Brings Baby to her breast: Yes  Positioning Problems: Amira needs to be reminded not to curl the baby's head down so that her chin is in her chest       Handouts:   Paced bottle feeding, Hands on pumping and Hand expression    Education:  Reviewed Latch: Discussed how to compress the breast to assist the baby to get a deeper latch  Reviewed Positioning for Dyad: Reviewed how to position the baby so that her head is straight or slightly tipped back with her lower lip and chin touching the breast and her nose just above the nippe  Reviewed Frequency/Supply & Demand: Continue to offer the breast and/or pump every 3 hours  Reviewed Alternative/Artificial Feedings: Discussed and demonstrated the use of paced bottle feeding      Plan:  Support given for Amira's commitment to breastfeeding  Reviewed the history and physical exam associated with a tongue tie  Discussed the affect that tongue tie can have on breastfeeding and the limited data that frenotomies can have any impact on speech, feeding, and dentition  After a review of the pros and cons of the procedur, consent was obtained and the procedure was performed  I have spent 30 minutes with Patient  today in which greater than 50% of this time was spent in counseling/coordination of care regarding Prognosis, Risks and benefits of tx options, Intructions for management and Impressions

## 2018-06-01 LAB — PLACENTA IN STORAGE: NORMAL

## 2018-06-06 ENCOUNTER — OFFICE VISIT (OUTPATIENT)
Dept: POSTPARTUM | Facility: CLINIC | Age: 38
End: 2018-06-06

## 2018-06-06 NOTE — PROGRESS NOTES
BREAST FEEDING FOLLOW UP VISIT    Informant/Relationship: Amira/mom    Discussion of General Lactation Issues: Teddy Rivera is still not latching and continues to make a clicking sound both at the bottle and when offered the breast  She is still dripping a lot of milk when at the breast  Rory Gonzalez thinks she may have had a milk blister, but she was able to unroof it this morning without difficutly  Infant is 15days old today  Interval Breastfeeding History:    Frequency of breast feeding: giving EBM via bottle every 3 hours during the day and every 3-4 hours at night  Does mother feel breastfeeding is effective: If no, explain: baby won't latch consistently and suck  Does infant appear satisfied after nursing:If no, explain: baby won't latch and suck consistenlty  Stooling pattern normal:Yes  Urinating frequently:Yes  Using shield or shells:No    Alternative/Artificial Feedings:   Bottle: Yes, for EBM  Cup: N/A  Syringe/Finger: N/A           Formula Type: none                     Amount: n/a            Breast Milk:                      Amount: about 2 oz            Frequency Q 3-4 Hr between feedings  Elimination Problems: No      Equipment:  Nipple Shield             Type: n/a             Size: n/a             Frequency of Use: n/a  Pump            Type: [place]            Frequency of Use: every 3 hours  Shells            Type: n/a            Frequency of use: n/a    Equipment Problems: no      Mom:  Breast: Normal  Nipple Assessment in General: Normal: elongated/eraser, no discoloration and no damage noted  Mother's Awareness of Feeding Cues                 Recognizes: Yes                  Verbalizes: Yes  Support System: yes  History of Breastfeeding: none  Changes/Stressors/Violence: needing to pump  Concerns/Goals: nurse exclusively until return to work and then provide EBM or nursing when home    Problems with Mom: none    Physical Exam   Constitutional: She is oriented to person, place, and time   She appears well-developed and well-nourished  Neck: Normal range of motion  Neck supple  No thyromegaly present  Cardiovascular: Normal rate, regular rhythm, normal heart sounds and intact distal pulses  No murmur heard  Pulmonary/Chest: Effort normal and breath sounds normal    Lymphadenopathy:     She has no cervical adenopathy  She has no axillary adenopathy  Neurological: She is alert and oriented to person, place, and time  Psychiatric: She has a normal mood and affect  Her behavior is normal  Judgment and thought content normal        Infant:  Behaviors: Alert  Color: Pink  Birth weight: 3 34kg  Current weight: 3 195kg    Problems with infant: difficulty sucking, clicking when eating      General Appearance:  Alert, active, no distress                            Head:  Normocephalic, AFOF, sutures opposed                            Eyes:   Conjunctiva clear, no drainage                            Ears:   Normally placed, no anomolies                           Nose:   Septum intact, no drainage or erythema                          Mouth:  No lesions                   Neck:  Supple, symmetrical, trachea midline, no adenopathy; Respiratory:  No grunting, flaring, retractions, breath sounds clear and equal           Cardiovascular:  Regular rate and rhythm  No murmur                     Abdomen:    Soft, non-tender, no masses, bowel sounds present, no HSM               Musculoskeletal:   Full range of motion                    Neurologic:   No abnormal movement, tone appropriate for gestational age, poor suck with limited vacuum achieved    [place] Latch:  Efficiency:               Lips Flanged: Yes, but not maintained on the breast, only on the "Breastflow" bottle nipple              Depth of latch: shallow on breast, deep on bottle nipple              Audible Swallow: Yes, on bottle only              Visible Milk: No              Wide Open/ Asymmetrical: Yes, on bottle only Suck Swallow Cycle: Breathing: unlabored, Coordinated: yes  Nipple Assessment after latch: not applicable  Latch Problems: Baby does not maintain a latch, she makes clicking noises and pulls back to just the nipple  She is unable to create much of a vacuum, even at the bottle and continues to click at the bottle as well  This sound can be decreased using the "dancer hold" at the bottle, but she is unable to stimulate flow from the breast and just gets frustrated  Position:  Infant's Ergonomics/Body               Body Alignment: Yes               Head Supported: Yes               Close to Mom's body/ Lifted/ Supported: Yes               Mom's Ergonomics/Body: Yes                           Supported: Yes                           Sitting Back: Yes                           Brings Baby to her breast: Yes  Positioning Problems: none, but the baby isn't effective at there breast      Handouts:   Paced bottle feeding, Hands on pumping and Hand expression    Education:  Reviewed Latch: Reassured Aimra that her position and attempts for latch are good  Unfortunately,  Marquita Youssef is unable to generate the suction needed to remove milk from the breast   Reviewed Frequency/Supply & Demand: Continue to express breast milk every 3 hours during the day and every 5 hours at night  Discussed how to use the stimulation and expression phases on the pumps to stimulate letdown phases from the breast and how to use both hands on pumping and hand expression to maximize milk production while relying on the breast pump  Reviewed Infant:Cues and varied States of Awareness  Reviewed Mom/Breast care: Recommended the application of olive or coconut oil to her left nipple following deroofing of a possible nipple bleb  Plan:  Support given for commitment to providing breastmilk for Marquita Youssef   Recommended continued pumping every 3-5 hours and reviewed how to make the most out of the pump by using the stimulation and expression phases in repetition to stimulate more than one letdown per pumping session  I have spent 30 minutes with Patient  today in which greater than 50% of this time was spent in counseling/coordination of care regarding Prognosis, instructions for management, and impressions   ,

## 2018-06-06 NOTE — PATIENT INSTRUCTIONS
Continue to offer expressed breast milk to the baby as tolerated  Hold Parra to give her chin, back, neck, and jaw/cheeks support as she takes the bottle  Offer the bottle every 3 hours until she has returned to birth weight and pump every 3 hours during the day and every 5 hours at night

## 2018-08-15 ENCOUNTER — OFFICE VISIT (OUTPATIENT)
Dept: POSTPARTUM | Facility: CLINIC | Age: 38
End: 2018-08-15
Payer: COMMERCIAL

## 2018-08-15 DIAGNOSIS — Z71.89 ENCOUNTER FOR BREAST FEEDING COUNSELING: Primary | ICD-10-CM

## 2018-08-15 PROCEDURE — 99404 PREV MED CNSL INDIV APPRX 60: CPT | Performed by: PEDIATRICS

## 2018-08-15 NOTE — PROGRESS NOTES
BREAST FEEDING FOLLOW UP VISIT    Informant/Relationship: Amira    Discussion of General Lactation Issues: Rasheeda Jacobsen has had two revisions of tongue tie and a recent release of a lip tie  She has respiratory issues and is followed by ENT and SLP  Amira would like to be able to feed at the breast in any capacity eventually  Aida struggles to feed with a bottle nipple as well  Infant is 2 months old today  Interval Breastfeeding History:    Frequency of breast feeding: Attempts several times a day  Does mother feel breastfeeding is effective: No  Does infant appear satisfied after nursing:No  Stooling pattern normal:Yes  Urinating frequently:Yes  Using shield or shells:No    Alternative/Artificial Feedings:   Bottle: Yes, for every feeding  Cup: No  Syringe/Finger: No           Formula Type: none                     Amount: n/a            Breast Milk:                      Amount: 3-4 ounces            Frequency Q 3 Hr between feedings  Elimination Problems: No      Equipment:  Nipple Shield             Type: none             Size: n/a             Frequency of Use: n/a  Pump            Type: Medela Freestyle and [place]            Frequency of Use: every 4 hours  Expresses 8 ounces each session  Shells            Type: none            Frequency of use: n/a    Equipment Problems: yes the [place] pump causes pain and bruising      Mom:  Breast: Normal   Small symmetrical breasts  Nipple Assessment in General: Normal: elongated/eraser, no discoloration and no damage noted  Mother's Awareness of Feeding Cues                 Recognizes: Yes                  Verbalizes: Yes  Support System: FOB, extended family  History of Breastfeeding:  other two children for an extended period  Changes/Stressors/Violence: Felicia Cox is stressed about ongoing health issues of her baby  Concerns/Goals:  Amira would like to feed at the breast at least partially    Problems with Mom: None    Physical Exam Constitutional: She is oriented to person, place, and time  She appears well-developed and well-nourished  HENT:   Head: Normocephalic and atraumatic  Neck: Normal range of motion  Cardiovascular: Normal rate, regular rhythm and normal heart sounds  Pulmonary/Chest: Effort normal and breath sounds normal    Musculoskeletal: Normal range of motion  Neurological: She is alert and oriented to person, place, and time  Skin: Skin is warm and dry  Psychiatric: She has a normal mood and affect  Her behavior is normal  Judgment and thought content normal        Infant:  Behaviors: Alert  Color: Pink  Birth weight: 3340gram  Current weight: 4835gram    Problems with infant: Trouble with Suck and latch      General Appearance:  Alert, active, no distress                            Head:  Normocephalic, AFOF, sutures opposed                            Eyes:   Conjunctiva clear, no drainage                            Ears:   Normally placed, no anomolies                           Nose:   no drainage or erythema                          Mouth:  No lesions  Tongue extends beyond the lower lip, lateralizes completely and elevates to palate  Hyperactive gag reflex  Very high, arched palate with ruggae  Chin recessed  Healing lingual and labial frenulum wounds (post laser revision)                   Neck:  Supple, symmetrical, trachea midline  Some resistance when turning head to the right side  Respiratory:  No grunting, flaring, retractions, breath sounds clear and equal           Cardiovascular:  Regular rate and rhythm  No murmur  Adequate perfusion/capillary refill  Abdomen:    Soft, non-tender, no masses, bowel sounds present            Genitourinary:  Normal female genitalia                         Spine:   No abnormalities noted       Musculoskeletal:   Full range of motion    Feet turn in          Skin/Hair/Nails:   Skin warm, dry, and intact, no rashes or abnormal dyspigmentation or lesions               Neurologic:   No abnormal movement, tone appropriate for gestational age     Latch:  Efficiency:   Latch Problems: Narinder Cardoso would not latch after a few attempts and Amira did not want to force her  She was bottle fed expressed breast milk  Mostly biting motions noted and she spilled quite a bit of milk around the nipple  The feeding was very noisy and she needed frequent breaks  She became more disorganized as the feeding progressed  Position:  Infant's Ergonomics/Body               Body Alignment: Yes               Head Supported: Yes               Close to Mom's body/ Lifted/ Supported: Yes               Mom's Ergonomics/Body: Yes                           Supported: Yes                           Sitting Back: Yes                           Brings Baby to her breast: Yes  Positioning Problems: None      Handouts:   None    Education:  Discussed options for continued support of sucking skills by SLP/feeding specialty along with continued support of lactation and breastfeeding behaviors  Plan for breastfeeding    Reassurance and support given  Spend lots of time skin to skin and offer the breast when Narinder Cardoso is very calm and maybe a little sleepy  Position her at the breast in a similar position to how you bottle feed  Support her lower jaw with your finger and bring her chin deep into the breast   You can also try laid back nursing and allow Aida to come to the breast on her terms  Follow up with the feeding clinic as scheduled  You may want to consider consulting with a SLP who also has a breastfeeding background  Narinder Cardoso will need continued support and a lot of patience  With time she may be able to nurse at the breast or she may spend time at the breast for non-nutritive sucking  Please call with any additional questions or concerns          I have spent 60 minutes with Patient and family today in which greater than 50% of this time was spent in counseling/coordination of care regarding Patient and family education

## 2018-08-15 NOTE — PATIENT INSTRUCTIONS
Plan for breastfeeding    Reassurance and support given  Spend lots of time skin to skin and offer the breast when Sulma Montalvo is very calm and maybe a little sleepy  Position her at the breast in a similar position to how you bottle feed  Support her lower jaw with your finger and bring her chin deep into the breast   You can also try laid back nursing and allow Aida to come to the breast on her terms  Follow up with the feeding clinic as scheduled  You may want to consider consulting with a SLP who also has a breastfeeding background  Sulma Montalvo will need continued support and a lot of patience  With time she may be able to nurse at the breast or she may spend time at the breast for non-nutritive sucking  Please call with any additional questions or concerns

## 2018-08-15 NOTE — PROGRESS NOTES
INITIAL BREAST FEEDING EVALUATION    Informant/Relationship: Amira    Discussion of General Lactation Issues: ***    Infant is 2 months old today   History:  Fertility Problem:{yes/no/unknown:49958}  Breast changes:{yes/no/unknown:09580}  : {yes/no/unknown:15671}  Full term:{yes/no/unknown:03294}   labor:{yes/no/unknown:50772}  First nursing/attempt < 1 hour after birth:{yes/no/unknown:85025}  Skin to skin following delivery:{yes/no/unknown:87622}  Breast changes after delivery:{yes/no/unknown:11309}  Rooming in (infant in room with mother with exception of procedures, eg  Circumcision: {yes/no/unknown:65125}  Blood sugar issues:{yes/no/unknown:54418}  NICU stay:{yes/no/unknown:11733}  Jaundice:{yes/no/unknown:17331}  Phototherapy:{yes/no/unknown:10677}  Supplement given: (list supplement and method used as well as reason(s):{yes/no/unknown:72726}    Past Medical History:   Diagnosis Date    Abnormal Pap smear of cervix     Asthma     childhood    History of PCOS     HPV in female     Varicella     childhood         Current Outpatient Prescriptions:     Cholecalciferol (VITAMIN D) 2000 UNITS tablet, Take 2,000 Units by mouth daily  , Disp: , Rfl:     Docosahexaenoic Acid (DHA PO), Take 1 tablet by mouth daily  , Disp: , Rfl:     folic acid (FOLVITE) 1 mg tablet, Take 1 mg by mouth daily  , Disp: , Rfl:     Prenatal Vit-Fe Fumarate-FA (PRENATAL VITAMIN PO), Take 1 tablet by mouth daily  , Disp: , Rfl:     Allergies   Allergen Reactions    Seasonal Ic [Cholestatin] Allergic Rhinitis       History   Drug Use No       Social History     Interval Breastfeeding History:    Frequency of breast feeding: ***  Does mother feel breastfeeding is effective: {YES/ NO:34029}  Does infant appear satisfied after nursing:{YES/ NO:23614}  Stooling pattern normal: l{YES/ NO:35425}  Urinating frequently:{YES/ NO:49902}  Using shield or shells: {yes:97304}    Alternative/Artificial Feedings:   Bottle: {YES-NO-NA:25538}  Cup: {YES-NO-NA:70207}  Syringe/Finger: {YES-NO-NA:17517}           Formula Type: ***                     Amount: ***            Breast Milk:                      Amount: ***            Frequency Q *** Hr between feedings  Elimination Problems: {YES/ AI:02440}      Equipment:  Nipple Shield             Type: ***             Size: ***             Frequency of Use: ***  Pump            Type: ***            Frequency of Use: ***  Shells            Type: ***            Frequency of use: ***    Equipment Problems: {Yes      TX:22174}    Mom:  Breast: {BREAST ASSESSMENT:54213}  Nipple Assessment in General: {NIPPLE ASSESSMENT:89535}  Mother's Awareness of Feeding Cues                 Recognizes: {Yes/No:68508}                  Verbalizes: {Yes/No:40444}  Support System: ***  History of Breastfeeding: ***  Changes/Stressors/Violence: ***  Concerns/Goals: ***    Problems with Mom: ***    OBGyn Exam    Infant:  Behaviors: {BEHAVIORS:17308}  Color: {SKIN COLOR:11383}  Birth weight: ***  Current weight: ***    Problems with infant: ***    {SL IP EXAM; COMPLETE :86972}     Latch:  Efficiency:               Lips Flanged: {Yes/No:24467}              Depth of latch: ***              Audible Swallow: {Yes/No:22054}              Visible Milk: {Yes/No:18265}              Wide Open/ Asymmetrical: {Yes/No:71979}              Suck Swallow Cycle: Breathing: ***, Coordinated: ***  Nipple Assessment after latch: {NIPPLE ASSESSMENT:99720}  Latch Problems: ***    Position:  Infant's Ergonomics/Body               Body Alignment: {Yes/No:71850}               Head Supported: {Yes/No:01303}               Close to Mom's body/ Lifted/ Supported: {Yes/No:74255}               Mom's Ergonomics/Body: {Yes/No:94215}                           Supported: {Yes/No:66499}                           Sitting Back: {Yes/No:62354}                           Brings Baby to her breast: {Yes/No:02132}  Positioning Problems: ***      Handouts:   {Handout list:92622}    Education:  Reviewed Latch: ***  Reviewed Positioning for Dyad: ***  Reviewed Frequency/Supply & Demand: ***  Reviewed Infant:Cues and varied States of Awareness  Reviewed Infant Elimination: ***  Reviewed Alternative/Artificial Feedings: ***  Reviewed Mom/Breast care: ***  Reviewed Equipment: ***      Plan:  ***    I have spent *** minutes with {Patient /Family:06621} today in which greater than 50% of this time was spent in counseling/coordination of care regarding {AMB Counseling Topics:1726518183}

## 2018-09-03 NOTE — PROGRESS NOTES
I have reviewed the notes, assessments, and/or procedures performed by Chriss Schmidt RN, IBCLC, I concur with her/his documentation of Amira Jesus

## 2018-12-12 ENCOUNTER — OFFICE VISIT (OUTPATIENT)
Dept: OBGYN CLINIC | Facility: HOSPITAL | Age: 38
End: 2018-12-12
Payer: COMMERCIAL

## 2018-12-12 VITALS
HEART RATE: 73 BPM | HEIGHT: 68 IN | WEIGHT: 139 LBS | SYSTOLIC BLOOD PRESSURE: 112 MMHG | BODY MASS INDEX: 21.07 KG/M2 | DIASTOLIC BLOOD PRESSURE: 71 MMHG

## 2018-12-12 DIAGNOSIS — M75.81 RIGHT ROTATOR CUFF TENDINITIS: ICD-10-CM

## 2018-12-12 DIAGNOSIS — R20.2 RIGHT HAND PARESTHESIA: Primary | ICD-10-CM

## 2018-12-12 PROCEDURE — 99203 OFFICE O/P NEW LOW 30 MIN: CPT | Performed by: ORTHOPAEDIC SURGERY

## 2018-12-12 NOTE — PROGRESS NOTES
ASSESSMENT/PLAN:    Assessment:   Right hand paresthesias, suspect carpal and cubital tunnel     Plan:   EMG to evaluate paresthesias  Activity modification discussed with the patient today  Formal therapy for shoulder and nerve glides    Follow Up:  6 weeks    General Discussions:  Carpal Tunnel Syndrome: The anatomy and physiology of carpal tunnel syndrome was discussed with the patient today  Increase pressure localized under the transverse carpal ligament can cause pain, numbness, tingling, or dysesthesias within the median nerve distribution as well as feelings of fatigue, clumsiness, or awkwardness  These symptoms typically occur at night and worse in the morning upon waking  Eventually, untreated carpal tunnel syndrome can result in weakness and permanent loss of muscle within the thenar compartment of the hand  Treatment options were discussed with the patient  Conservative treatment includes nocturnal resting splints to keep the nerve in a neutral position, ergonomic changes within the work or home environment, activity modification, and tendon gliding exercises  Steroid injections within the carpal canal can help a majority of patients, however this is often self-limited in a majority of patients  Surgical intervention to divide the transverse carpal ligament typically results in a long-lasting relief of the patient's complaints, with the recurrence rate of less than 1%  Cubital Tunnel Syndrome: The anatomy and physiology of cubital tunnel syndrome were discussed with the patient today in the office  Typically, increased elbow flexion activities decrease blood flow within the intraneural spaces, resulting in a feeling of numbness, tingling, weakness, or clumsiness within the hand and fingers  Occasionally, anatomic structures such as medial elbow osteophytes, the medial head of the triceps, were subluxing ulnar nerve may result in increased pressure or aggravation at the cubital tunnel    Typical signs and symptoms usually include numbness and tingling within the ring and small finger, weakness with , and weakness with pinch  Conservative treatment and includes nocturnal bracing to keep the elbow in a semi-extended position, activity modification, therapy, and avoiding excessive elbow flexion activities  A majority of patients typically respond to conservative treatment over a period of approximately 3-6 months  EMG/NCV testing of the ulnar nerve at the elbow is not as reliable as carpal tunnel syndrome  Surgical intervention in the form of in situ release of the ulnar nerve at the elbow or ulnar nerve transposition may be required in up to 20% of patients      _____________________________________________________  CHIEF COMPLAINT:  Chief Complaint   Patient presents with    Right Elbow - Numbness         SUBJECTIVE:  Michelle Pacheco is a 44y o  year old female who presents with complaints of right hand n/t  Patient states for the last 2 months has noticed these symptoms  Primarily the ring finger and palm, but it can spread to other fingers  Believe she may have had carpal tunnel       PAST MEDICAL HISTORY:  Past Medical History:   Diagnosis Date    Abnormal Pap smear of cervix     Asthma     childhood    History of PCOS     HPV in female     Varicella     childhood       PAST SURGICAL HISTORY:  Past Surgical History:   Procedure Laterality Date     SECTION      COLPOSCOPY      FOOT SURGERY Right     NOSE SURGERY         FAMILY HISTORY:  Family History   Problem Relation Age of Onset    [de-identified] / Stillbirths Mother     Asthma Brother     Stroke Maternal Grandmother     Hypertension Maternal Grandmother     Hypertension Maternal Grandfather     Heart disease Paternal Grandmother     Heart disease Paternal Grandfather        SOCIAL HISTORY:  Social History   Substance Use Topics    Smoking status: Never Smoker    Smokeless tobacco: Never Used    Alcohol use No MEDICATIONS:    Current Outpatient Prescriptions:     Cholecalciferol (VITAMIN D) 2000 UNITS tablet, Take 2,000 Units by mouth daily  , Disp: , Rfl:     folic acid (FOLVITE) 1 mg tablet, Take 1 mg by mouth daily  , Disp: , Rfl:     Prenatal Vit-Fe Fumarate-FA (PRENATAL VITAMIN PO), Take 1 tablet by mouth daily  , Disp: , Rfl:     Docosahexaenoic Acid (DHA PO), Take 1 tablet by mouth daily  , Disp: , Rfl:     ALLERGIES:  Allergies   Allergen Reactions    Seasonal Ic [Cholestatin] Allergic Rhinitis       REVIEW OF SYSTEMS:  Pertinent items are noted in HPI  A comprehensive review of systems was negative  LABS:  HgA1c:   Lab Results   Component Value Date    HGBA1C 5 1 10/28/2015     BMP:   Lab Results   Component Value Date    GLUCOSE 76 10/28/2015    CALCIUM 8 8 10/28/2015     10/28/2015    K 3 6 10/28/2015    CO2 29 10/28/2015     10/28/2015    BUN 17 10/28/2015    CREATININE 0 65 10/28/2015         _____________________________________________________  PHYSICAL EXAMINATION:  General: well developed and well nourished, alert, oriented times 3 and appears comfortable  Psychiatric: Normal  HEENT: Trachea Midline, No torticollis  Cardiovascular: No discernable arrhythmia  Pulmonary: No wheezing or stridor  Skin: No masses, erthema, lacerations, fluctation, ulcerations  Neurovascular: Sensation Intact to the Median, Ulnar, Radial Nerve, Motor Intact to the Median, Ulnar, Radial Nerve and Pulses Intact    MUSCULOSKELETAL EXAMINATION:  RIGHT SIDE:  Carpal tunnel:  No weakness APB, No atrophy thenar muscles, Postive Tinel's, Positive Derkin's Compression Test (15 seconds) and Positive Phalan's Test and Cubital Tunnel:  No ulnar nerve subluxation, No atrophy, Negative clawing, Negative wartenberg's sign, Negative Tinel's to the cubital tunnel and Positive Elbow Flexion/Compression Test (6 seconds)  Positive scratch collapse test to the right carpal and cubital tunnels       Shoulder: Patient has some discomfort with Williamson Impingement, but 5/5 strength   Negative Walker's   _____________________________________________________  STUDIES REVIEWED:  No Studies to review      PROCEDURES PERFORMED:  Procedures  No Procedures performed today   Scribe Attestation    I,:   Cipriano Alba PA-C am acting as a scribe while in the presence of the attending physician :        I,:   Cahndni Buck MD personally performed the services described in this documentation    as scribed in my presence :

## 2018-12-19 ENCOUNTER — EVALUATION (OUTPATIENT)
Dept: PHYSICAL THERAPY | Facility: CLINIC | Age: 38
End: 2018-12-19
Payer: COMMERCIAL

## 2018-12-19 DIAGNOSIS — R20.2 RIGHT HAND PARESTHESIA: ICD-10-CM

## 2018-12-19 PROCEDURE — 97163 PT EVAL HIGH COMPLEX 45 MIN: CPT | Performed by: PHYSICAL THERAPIST

## 2018-12-19 PROCEDURE — 97110 THERAPEUTIC EXERCISES: CPT | Performed by: PHYSICAL THERAPIST

## 2018-12-19 PROCEDURE — G8984 CARRY CURRENT STATUS: HCPCS | Performed by: PHYSICAL THERAPIST

## 2018-12-19 PROCEDURE — 97140 MANUAL THERAPY 1/> REGIONS: CPT | Performed by: PHYSICAL THERAPIST

## 2018-12-19 PROCEDURE — G8985 CARRY GOAL STATUS: HCPCS | Performed by: PHYSICAL THERAPIST

## 2018-12-19 NOTE — PROGRESS NOTES
PT Evaluation     Today's date: 2018  Patient name: Jamaica Horn  : 1980  MRN: [id number]  Referring provider: David Pal PA-C  Dx:   Encounter Diagnosis     ICD-10-CM    1  Right hand paresthesia R20 2 Ambulatory referral to PT/OT hand therapy                  Assessment  Assessment details: Symptoms appear to have cervical, shoulder and elbow component  Impairments: abnormal muscle firing, lacks appropriate home exercise program and pain with function  Understanding of Dx/Px/POC: good   Prognosis: good    Goals  Independent with HEP  Decrease symptoms  Return to normal LOF    Plan  Plan details: 1-2x/wk  Planned modality interventions: TENS and traction  Planned therapy interventions: manual therapy, strengthening and stretching  Duration in weeks: 12        Subjective Evaluation    History of Present Illness  Mechanism of injury: This pt presents with chronic R hand paresthesias  She reports an insidious onset of paresthesias in 3rd finger and then into palm and wrist   She occasionally feels pain in ulnar groove and occasionally in R shoulder  Denies neck pain or radiating symptoms  Hx of R shoulder tendonitis, responded well to PT    Had EMG  Took Medrol dose pack with no improvement  Pain  At worst pain ratin  Progression: worsening          Objective    Flowsheet Rows      Most Recent Value   PT/OT G-Codes   Assessment Type  Evaluation   G code set  Carrying, Moving & Handling Objects   Carrying, Moving and Handling Objects Current Status ()  CI   Carrying, Moving and Handling Objects Goal Status ()  CI          Cervical AROM wnl, no effect on symptoms  Overpressure: NE  Repeated flexion increased symptoms in 4th, 5th digit and hypothenar eminence      R 2nd rib appears anterior  Mellisa test (-)  Adson's test (+)    MMT   ER 4/5 bilat  IR wnl  R abd 4/5 with shoulder pain    Elbow flexion test (-)  tinels at ulnar groove (-)   strength wfl  ULTT ulnar, median (-)  Palpation of TP in R post cuff reproduced elbow pain      Precautions: none    Daily Treatment Diary       Manuals 12/19            Christopher Correa 4'            Laser R elbow 4'            TPM r post cuff 3'            ktape  Ulnar groove x            Exercise Diary              UBE             Median n glide IP            Ulnar n glide IP            pec stretch IP            rows nv                                                                                                                                                                                                                                         Modalities

## 2018-12-27 ENCOUNTER — OFFICE VISIT (OUTPATIENT)
Dept: PHYSICAL THERAPY | Facility: CLINIC | Age: 38
End: 2018-12-27
Payer: COMMERCIAL

## 2018-12-27 DIAGNOSIS — R20.2 RIGHT HAND PARESTHESIA: Primary | ICD-10-CM

## 2018-12-27 PROCEDURE — 97140 MANUAL THERAPY 1/> REGIONS: CPT | Performed by: PHYSICAL THERAPIST

## 2018-12-27 PROCEDURE — 97110 THERAPEUTIC EXERCISES: CPT | Performed by: PHYSICAL THERAPIST

## 2018-12-27 NOTE — PROGRESS NOTES
Daily Note     Today's date: 2018  Patient name: Edgardo Lopez  : 1980  MRN: [id number]  Referring provider: Whitley Khan PA-C  Dx:   Encounter Diagnosis     ICD-10-CM    1  Right hand paresthesia R20 2                   Subjective: pt reports overall decreased symptoms in UE but yesterday she awoke with a stiff neck, felt as though it was "stuck" with increased UE symptoms  Now able to move her neck but it feels sore      Objective: See treatment diary below  Precautions: none     Daily Treatment Diary         Manuals                    Thor & cerv Pas 4'  5' &prom                   Laser R elbow 4'  4'                   TPM r post cuff 3'                     ktape  Ulnar groove x  x                   Exercise Diary                        UBE    3/3                   Median n glide IP  x                   Ulnar n glide IP  x                   pec stretch IP                     rows nv                      cerv ret and ext   IP                                                                                                                                                                                                                                                                                                                                                                                                                   Modalities                                                                          Cervical PROM wnl    Assessment: Tolerated treatment well  Patient would benefit from continued PT      Plan: Continue per plan of care

## 2018-12-31 ENCOUNTER — OFFICE VISIT (OUTPATIENT)
Dept: PHYSICAL THERAPY | Facility: CLINIC | Age: 38
End: 2018-12-31
Payer: COMMERCIAL

## 2018-12-31 DIAGNOSIS — R20.2 RIGHT HAND PARESTHESIA: Primary | ICD-10-CM

## 2018-12-31 PROCEDURE — 97140 MANUAL THERAPY 1/> REGIONS: CPT | Performed by: PHYSICAL THERAPIST

## 2018-12-31 PROCEDURE — 97110 THERAPEUTIC EXERCISES: CPT | Performed by: PHYSICAL THERAPIST

## 2018-12-31 NOTE — PROGRESS NOTES
Daily Note     Today's date: 2018  Patient name: Edgardo Lopez  : 1980  MRN: [id number]  Referring provider: Whitley Khan PA-C  Dx:   Encounter Diagnosis     ICD-10-CM    1  Right hand paresthesia R20 2        Start Time: 1200  Stop Time:   Total time in clinic (min): 42 minutes    Subjective: pt reports overall decreased symptoms in UE   Neck is still sore but improving  Objective: See treatment diary below  Precautions: none     Daily Treatment Diary         Manuals                  Thor & cerv Pas 4'  5' &prom  cerv 5'                 Laser R elbow 4'  4'  4'                 TPM r post cuff 3'    UT 3'                 ktape  Ulnar groove x  x                   Exercise Diary                        UBE    3/3  3/3                 Median n glide IP  x  xx                 Ulnar n glide IP  x                   pec stretch IP                     rows nv    blue x30                  cerv ret and ext   IP  passive x10                  bilat ER     Red x30                                                                                                                                                                                                                                                                                                                                                                                         Modalities                                                                          Cervical PROM wnl    Assessment: Tolerated treatment well  Patient would benefit from continued PT      Plan: Continue per plan of care

## 2019-01-10 ENCOUNTER — APPOINTMENT (OUTPATIENT)
Dept: PHYSICAL THERAPY | Facility: CLINIC | Age: 39
End: 2019-01-10
Payer: COMMERCIAL

## 2019-01-11 ENCOUNTER — OFFICE VISIT (OUTPATIENT)
Dept: PHYSICAL THERAPY | Facility: CLINIC | Age: 39
End: 2019-01-11
Payer: COMMERCIAL

## 2019-01-11 DIAGNOSIS — R20.2 RIGHT HAND PARESTHESIA: Primary | ICD-10-CM

## 2019-01-11 PROCEDURE — 97110 THERAPEUTIC EXERCISES: CPT | Performed by: PHYSICAL THERAPIST

## 2019-01-11 NOTE — PROGRESS NOTES
Daily Note     Today's date: 2019  Patient name: Ben Birch  : 1980  MRN: [id number]  Referring provider: Ciera Ryan PA-C  Dx:   Encounter Diagnosis     ICD-10-CM    1  Right hand paresthesia R20 2                   Subjective: pt reports overall decreased symptoms in UE   Neck is still sore but improving  Objective: See treatment diary below  Precautions: none     Daily Treatment Diary         Manuals                Thor & cerv Pas 4'  5' &prom  cerv 5'                 Laser R elbow 4'  4'  4'                 TPM r post cuff 3'    UT 3'                 ktape  Ulnar groove x  x                   Exercise Diary                        UBE    3/3  3/3                 Median n glide IP  x  xx  x               Ulnar n glide IP  x    x               pec stretch IP                     rows nv    blue x30  review                cerv ret and ext   IP  passive x10                  bilat ER     Red x30  review                wall slides        3x10                empty can        3x10                                                                                                                                                                                                                                                                                                                                       Modalities                                                                          Cervical AROM wnl  R shoulder mmt wnl, abd 4/5  R Williamson +  Neers -  Pt I with HEP  Mld tightness noted with median nerve stretch      Assessment: Tolerated treatment well  Primary complaint is R shoulder pain which I feel will be addressed with strengthening  Plan: DC to HEP, f/u in 4-6 wks if symptoms do not improve

## 2019-02-07 ENCOUNTER — OFFICE VISIT (OUTPATIENT)
Dept: OBGYN CLINIC | Facility: CLINIC | Age: 39
End: 2019-02-07
Payer: COMMERCIAL

## 2019-02-07 VITALS
WEIGHT: 135 LBS | SYSTOLIC BLOOD PRESSURE: 100 MMHG | HEIGHT: 67 IN | DIASTOLIC BLOOD PRESSURE: 60 MMHG | BODY MASS INDEX: 21.19 KG/M2

## 2019-02-07 DIAGNOSIS — N76.0 ACUTE VAGINITIS: ICD-10-CM

## 2019-02-07 DIAGNOSIS — N72 CERVICITIS: ICD-10-CM

## 2019-02-07 DIAGNOSIS — B00.9 HERPES INFECTION: Primary | ICD-10-CM

## 2019-02-07 PROCEDURE — 99213 OFFICE O/P EST LOW 20 MIN: CPT | Performed by: PHYSICIAN ASSISTANT

## 2019-02-07 RX ORDER — VALACYCLOVIR HYDROCHLORIDE 1 G/1
1000 TABLET, FILM COATED ORAL 2 TIMES DAILY
Qty: 20 TABLET | Refills: 0 | Status: SHIPPED | OUTPATIENT
Start: 2019-02-07 | End: 2020-01-23 | Stop reason: ALTCHOICE

## 2019-02-07 NOTE — PROGRESS NOTES
Assessment/Plan:    No problem-specific Assessment & Plan notes found for this encounter  Diagnoses and all orders for this visit:    Herpes infection  -     valACYclovir (VALTREX) 1,000 mg tablet; Take 1 tablet (1,000 mg total) by mouth 2 (two) times a day for 10 days    Acute vaginitis          Subjective:      Patient ID: Jagjit Lott is a 43 y o  female  Pt presents today with vaginal discomfort, L>R  Sx's started 5 days ago  She reports shaving over the weekend and then wearing a very tight bodysuit, sx/s started shortly after that   + itching  +burning   +discharge  Some discomfort on left buttock as well  No dysuria  The following portions of the patient's history were reviewed and updated as appropriate: allergies, current medications, past family history, past medical history, past social history, past surgical history and problem list     Review of Systems   Constitutional: Negative for chills and fever  Genitourinary: Positive for genital sores, pelvic pain, vaginal discharge and vaginal pain (itching and burning)  Objective:      /60 (BP Location: Left arm, Patient Position: Sitting, Cuff Size: Standard)   Ht 5' 6 53" (1 69 m)   Wt 61 2 kg (135 lb)   Breastfeeding? Yes   BMI 21 44 kg/m²          Physical Exam   Constitutional: She appears well-developed and well-nourished  Genitourinary: There is rash (erythema) and tenderness on the right labia  There is no lesion or injury on the right labia  There is rash (erythema), tenderness (swelling) and lesion (multiple vesicles) on the left labia  Cervix exhibits no motion tenderness, no discharge and no friability  There is erythema in the vagina  Vaginal discharge found  Lymphadenopathy:        Right: No inguinal adenopathy present  Left: No inguinal adenopathy present  Nursing note and vitals reviewed

## 2019-02-10 LAB
A VAGINAE DNA VAG NAA+PROBE-LOG#: <3.25
A VAGINAE DNA VAG QL NAA+PROBE: NOT DETECTED
C TRACH DNA SPEC QL PROBE+SIG AMP: NOT DETECTED
G VAGINALIS DNA SPEC QL NAA+PROBE: DETECTED
G VAGINALIS DNA VAG NAA+PROBE-LOG#: >5.25
HSV1 DNA SPEC QL NAA+PROBE: NOT DETECTED
HSV2 DNA SPEC QL NAA+PROBE: NOT DETECTED
M GENITALIUM DNA SPEC QL NAA+PROBE: NOT DETECTED
N GONORRHOEA DNA SPEC QL NAA+PROBE: NOT DETECTED
SL AMB C.ALBICANS BY PCR: NOT DETECTED
SL AMB CANDIDA GENUS: NOT DETECTED
SL AMB MOBILIUNCUS (SPECIES) BY RT PCR: NOT DETECTED
T VAGINALIS RRNA SPEC QL NAA+PROBE: NOT DETECTED

## 2019-02-11 ENCOUNTER — TELEPHONE (OUTPATIENT)
Dept: OBGYN CLINIC | Facility: CLINIC | Age: 39
End: 2019-02-11

## 2019-02-11 NOTE — TELEPHONE ENCOUNTER
Pt believes she has a secondary infection to what she was seen for at her appt  She is starting to feel uncomfortable and would like to have something called into her pharmacy  Pt's culture is in her chart    Please review

## 2019-02-15 NOTE — PROGRESS NOTES
Pt was informed of BV results on Tuesday, 2/12/2019  An Rx was called in to her pharmacy for Flagyl 500mg po BID x 7 days by another provider

## 2019-09-14 ENCOUNTER — APPOINTMENT (OUTPATIENT)
Dept: LAB | Facility: HOSPITAL | Age: 39
End: 2019-09-14
Attending: OBSTETRICS & GYNECOLOGY
Payer: COMMERCIAL

## 2019-09-14 ENCOUNTER — TRANSCRIBE ORDERS (OUTPATIENT)
Dept: LAB | Facility: HOSPITAL | Age: 39
End: 2019-09-14

## 2019-09-14 DIAGNOSIS — Z32.00 PREGNANCY EXAMINATION OR TEST, PREGNANCY UNCONFIRMED: ICD-10-CM

## 2019-09-14 DIAGNOSIS — Z32.00 PREGNANCY EXAMINATION OR TEST, PREGNANCY UNCONFIRMED: Primary | ICD-10-CM

## 2019-09-14 LAB
B-HCG SERPL-ACNC: 199 MIU/ML
ESTRADIOL SERPL-MCNC: 208 PG/ML
PROGEST SERPL-MCNC: 21.2 NG/ML

## 2019-09-14 PROCEDURE — 36415 COLL VENOUS BLD VENIPUNCTURE: CPT

## 2019-09-14 PROCEDURE — 82670 ASSAY OF TOTAL ESTRADIOL: CPT

## 2019-09-14 PROCEDURE — 84702 CHORIONIC GONADOTROPIN TEST: CPT

## 2019-09-14 PROCEDURE — 84144 ASSAY OF PROGESTERONE: CPT

## 2019-09-16 ENCOUNTER — APPOINTMENT (OUTPATIENT)
Dept: LAB | Facility: HOSPITAL | Age: 39
End: 2019-09-16
Attending: OBSTETRICS & GYNECOLOGY
Payer: COMMERCIAL

## 2019-09-16 ENCOUNTER — TRANSCRIBE ORDERS (OUTPATIENT)
Dept: LAB | Facility: HOSPITAL | Age: 39
End: 2019-09-16

## 2019-09-16 DIAGNOSIS — N97.9 PRIMARY FEMALE INFERTILITY: ICD-10-CM

## 2019-09-16 DIAGNOSIS — Z32.00 PREGNANCY EXAMINATION OR TEST, PREGNANCY UNCONFIRMED: ICD-10-CM

## 2019-09-16 DIAGNOSIS — E28.2 POLYCYSTIC OVARIES: ICD-10-CM

## 2019-09-16 DIAGNOSIS — E28.9 DISORDER OF ENDOCRINE OVARY: Primary | ICD-10-CM

## 2019-09-16 LAB
B-HCG SERPL-ACNC: 482 MIU/ML
ESTRADIOL SERPL-MCNC: 225 PG/ML
PROGEST SERPL-MCNC: 27.5 NG/ML

## 2019-09-16 PROCEDURE — 84144 ASSAY OF PROGESTERONE: CPT

## 2019-09-16 PROCEDURE — 82670 ASSAY OF TOTAL ESTRADIOL: CPT

## 2019-09-16 PROCEDURE — 84702 CHORIONIC GONADOTROPIN TEST: CPT

## 2019-09-16 PROCEDURE — 36415 COLL VENOUS BLD VENIPUNCTURE: CPT

## 2019-09-18 ENCOUNTER — APPOINTMENT (OUTPATIENT)
Dept: LAB | Facility: HOSPITAL | Age: 39
End: 2019-09-18
Attending: OBSTETRICS & GYNECOLOGY
Payer: COMMERCIAL

## 2019-09-18 DIAGNOSIS — E28.2 POLYCYSTIC OVARIES: ICD-10-CM

## 2019-09-18 DIAGNOSIS — Z32.00 PREGNANCY EXAMINATION OR TEST, PREGNANCY UNCONFIRMED: ICD-10-CM

## 2019-09-18 DIAGNOSIS — E28.9 DISORDER OF ENDOCRINE OVARY: ICD-10-CM

## 2019-09-18 DIAGNOSIS — N97.9 PRIMARY FEMALE INFERTILITY: ICD-10-CM

## 2019-09-18 LAB
B-HCG SERPL-ACNC: 997 MIU/ML
ESTRADIOL SERPL-MCNC: 229 PG/ML
PROGEST SERPL-MCNC: 32.1 NG/ML

## 2019-09-18 PROCEDURE — 82670 ASSAY OF TOTAL ESTRADIOL: CPT

## 2019-09-18 PROCEDURE — 84144 ASSAY OF PROGESTERONE: CPT

## 2019-09-18 PROCEDURE — 36415 COLL VENOUS BLD VENIPUNCTURE: CPT

## 2019-09-18 PROCEDURE — 84702 CHORIONIC GONADOTROPIN TEST: CPT

## 2019-09-20 ENCOUNTER — APPOINTMENT (OUTPATIENT)
Dept: LAB | Facility: HOSPITAL | Age: 39
End: 2019-09-20
Attending: OBSTETRICS & GYNECOLOGY
Payer: COMMERCIAL

## 2019-09-20 ENCOUNTER — TRANSCRIBE ORDERS (OUTPATIENT)
Dept: LAB | Facility: HOSPITAL | Age: 39
End: 2019-09-20

## 2019-09-20 DIAGNOSIS — Z32.00 PREGNANCY EXAMINATION OR TEST, PREGNANCY UNCONFIRMED: ICD-10-CM

## 2019-09-20 DIAGNOSIS — E07.9 DISORDER OF THYROID: ICD-10-CM

## 2019-09-20 DIAGNOSIS — Z32.00 PREGNANCY EXAMINATION OR TEST, PREGNANCY UNCONFIRMED: Primary | ICD-10-CM

## 2019-09-20 LAB
B-HCG SERPL-ACNC: 2230 MIU/ML
ESTRADIOL SERPL-MCNC: 239 PG/ML
PROGEST SERPL-MCNC: 24.9 NG/ML
T3 SERPL-MCNC: 1.1 NG/ML (ref 0.6–1.8)
T4 SERPL-MCNC: 9.8 UG/DL (ref 4.7–13.3)
TSH SERPL DL<=0.05 MIU/L-ACNC: 2.6 UIU/ML (ref 0.36–3.74)

## 2019-09-20 PROCEDURE — 36415 COLL VENOUS BLD VENIPUNCTURE: CPT

## 2019-09-20 PROCEDURE — 84702 CHORIONIC GONADOTROPIN TEST: CPT

## 2019-09-20 PROCEDURE — 84144 ASSAY OF PROGESTERONE: CPT

## 2019-09-20 PROCEDURE — 82670 ASSAY OF TOTAL ESTRADIOL: CPT

## 2019-09-20 PROCEDURE — 84480 ASSAY TRIIODOTHYRONINE (T3): CPT

## 2019-09-20 PROCEDURE — 84436 ASSAY OF TOTAL THYROXINE: CPT

## 2019-09-20 PROCEDURE — 84443 ASSAY THYROID STIM HORMONE: CPT

## 2019-10-22 ENCOUNTER — INITIAL PRENATAL (OUTPATIENT)
Dept: OBGYN CLINIC | Facility: CLINIC | Age: 39
End: 2019-10-22

## 2019-10-22 VITALS — WEIGHT: 144 LBS | BODY MASS INDEX: 21.82 KG/M2 | HEIGHT: 68 IN

## 2019-10-22 DIAGNOSIS — Z34.81 PRENATAL CARE, SUBSEQUENT PREGNANCY IN FIRST TRIMESTER: Primary | ICD-10-CM

## 2019-10-22 PROCEDURE — OBC: Performed by: PHYSICIAN ASSISTANT

## 2019-10-23 NOTE — PROGRESS NOTES
Pt presents for prenatal consult  Her EDC is 19  She is 10 weeks--transfer from [facility] fertility  She is overall feeling well  Some nausea  Taking a PNV daily  flu shot discussed  Last pap was ~??   Pt with h/o zoster--would like BW with OB panel to recheck  Pt had c/s then 2

## 2019-10-25 DIAGNOSIS — Z3A.10 10 WEEKS GESTATION OF PREGNANCY: Primary | ICD-10-CM

## 2019-10-26 ENCOUNTER — APPOINTMENT (OUTPATIENT)
Dept: LAB | Facility: HOSPITAL | Age: 39
End: 2019-10-26
Attending: OBSTETRICS & GYNECOLOGY
Payer: COMMERCIAL

## 2019-10-26 DIAGNOSIS — Z3A.10 10 WEEKS GESTATION OF PREGNANCY: ICD-10-CM

## 2019-10-26 LAB
ABO GROUP BLD: NORMAL
BACTERIA UR QL AUTO: ABNORMAL /HPF
BASOPHILS # BLD AUTO: 0.03 THOUSANDS/ΜL (ref 0–0.1)
BASOPHILS NFR BLD AUTO: 1 % (ref 0–1)
BILIRUB UR QL STRIP: NEGATIVE
BLD GP AB SCN SERPL QL: NEGATIVE
CLARITY UR: CLEAR
COLOR UR: ABNORMAL
EOSINOPHIL # BLD AUTO: 0.1 THOUSAND/ΜL (ref 0–0.61)
EOSINOPHIL NFR BLD AUTO: 2 % (ref 0–6)
ERYTHROCYTE [DISTWIDTH] IN BLOOD BY AUTOMATED COUNT: 13 % (ref 11.6–15.1)
GLUCOSE UR STRIP-MCNC: NEGATIVE MG/DL
HBV SURFACE AG SER QL: NORMAL
HCT VFR BLD AUTO: 38.9 % (ref 34.8–46.1)
HGB BLD-MCNC: 13.5 G/DL (ref 11.5–15.4)
HGB UR QL STRIP.AUTO: NEGATIVE
IMM GRANULOCYTES # BLD AUTO: 0.01 THOUSAND/UL (ref 0–0.2)
IMM GRANULOCYTES NFR BLD AUTO: 0 % (ref 0–2)
KETONES UR STRIP-MCNC: ABNORMAL MG/DL
LEUKOCYTE ESTERASE UR QL STRIP: ABNORMAL
LYMPHOCYTES # BLD AUTO: 1.65 THOUSANDS/ΜL (ref 0.6–4.47)
LYMPHOCYTES NFR BLD AUTO: 33 % (ref 14–44)
MCH RBC QN AUTO: 29.5 PG (ref 26.8–34.3)
MCHC RBC AUTO-ENTMCNC: 34.7 G/DL (ref 31.4–37.4)
MCV RBC AUTO: 85 FL (ref 82–98)
MONOCYTES # BLD AUTO: 0.51 THOUSAND/ΜL (ref 0.17–1.22)
MONOCYTES NFR BLD AUTO: 10 % (ref 4–12)
NEUTROPHILS # BLD AUTO: 2.68 THOUSANDS/ΜL (ref 1.85–7.62)
NEUTS SEG NFR BLD AUTO: 54 % (ref 43–75)
NITRITE UR QL STRIP: NEGATIVE
NON-SQ EPI CELLS URNS QL MICRO: ABNORMAL /HPF
NRBC BLD AUTO-RTO: 0 /100 WBCS
PH UR STRIP.AUTO: 6.5 [PH]
PLATELET # BLD AUTO: 228 THOUSANDS/UL (ref 149–390)
PMV BLD AUTO: 10.3 FL (ref 8.9–12.7)
PROT UR STRIP-MCNC: NEGATIVE MG/DL
RBC # BLD AUTO: 4.57 MILLION/UL (ref 3.81–5.12)
RBC #/AREA URNS AUTO: ABNORMAL /HPF
RH BLD: POSITIVE
RUBV IGG SERPL IA-ACNC: 104.2 IU/ML
SP GR UR STRIP.AUTO: 1.02 (ref 1–1.03)
SPECIMEN EXPIRATION DATE: NORMAL
UROBILINOGEN UR QL STRIP.AUTO: 0.2 E.U./DL
WBC # BLD AUTO: 4.98 THOUSAND/UL (ref 4.31–10.16)
WBC #/AREA URNS AUTO: ABNORMAL /HPF

## 2019-10-26 PROCEDURE — 86735 MUMPS ANTIBODY: CPT

## 2019-10-26 PROCEDURE — 87086 URINE CULTURE/COLONY COUNT: CPT

## 2019-10-26 PROCEDURE — 81001 URINALYSIS AUTO W/SCOPE: CPT

## 2019-10-26 PROCEDURE — 80081 OBSTETRIC PANEL INC HIV TSTG: CPT

## 2019-10-26 PROCEDURE — 36415 COLL VENOUS BLD VENIPUNCTURE: CPT

## 2019-10-27 LAB
BACTERIA UR CULT: NORMAL
HIV 1+2 AB+HIV1 P24 AG SERPL QL IA: NORMAL

## 2019-10-28 ENCOUNTER — DOCUMENTATION (OUTPATIENT)
Dept: ANESTHESIOLOGY | Facility: HOSPITAL | Age: 39
End: 2019-10-28

## 2019-10-28 ENCOUNTER — INITIAL PRENATAL (OUTPATIENT)
Dept: OBGYN CLINIC | Facility: CLINIC | Age: 39
End: 2019-10-28
Payer: COMMERCIAL

## 2019-10-28 DIAGNOSIS — Z11.3 SCREENING FOR STDS (SEXUALLY TRANSMITTED DISEASES): ICD-10-CM

## 2019-10-28 DIAGNOSIS — Z3A.10 10 WEEKS GESTATION OF PREGNANCY: ICD-10-CM

## 2019-10-28 DIAGNOSIS — Z12.4 ROUTINE CERVICAL SMEAR: ICD-10-CM

## 2019-10-28 DIAGNOSIS — Z11.8 SCREENING FOR CHLAMYDIAL DISEASE: ICD-10-CM

## 2019-10-28 DIAGNOSIS — Z36.89 ENCOUNTER FOR OTHER SPECIFIED ANTENATAL SCREENING: Primary | ICD-10-CM

## 2019-10-28 LAB
MUV IGG SER QL: NORMAL
RPR SER QL: NORMAL

## 2019-10-28 PROCEDURE — 87591 N.GONORRHOEAE DNA AMP PROB: CPT | Performed by: OBSTETRICS & GYNECOLOGY

## 2019-10-28 PROCEDURE — 87491 CHLMYD TRACH DNA AMP PROBE: CPT | Performed by: OBSTETRICS & GYNECOLOGY

## 2019-10-28 PROCEDURE — PNV: Performed by: OBSTETRICS & GYNECOLOGY

## 2019-10-28 PROCEDURE — G0145 SCR C/V CYTO,THINLAYER,RESCR: HCPCS | Performed by: OBSTETRICS & GYNECOLOGY

## 2019-10-28 PROCEDURE — 76801 OB US < 14 WKS SINGLE FETUS: CPT | Performed by: OBSTETRICS & GYNECOLOGY

## 2019-10-29 LAB — MUV IGM SER QL: <0.8 AU (ref 0–0.79)

## 2019-10-30 LAB
C TRACH DNA SPEC QL NAA+PROBE: NEGATIVE
N GONORRHOEA DNA SPEC QL NAA+PROBE: NEGATIVE

## 2019-11-04 LAB
LAB AP GYN PRIMARY INTERPRETATION: NORMAL
Lab: NORMAL

## 2019-11-14 ENCOUNTER — ROUTINE PRENATAL (OUTPATIENT)
Dept: PERINATAL CARE | Facility: CLINIC | Age: 39
End: 2019-11-14
Payer: COMMERCIAL

## 2019-11-14 ENCOUNTER — DOCUMENTATION (OUTPATIENT)
Dept: ANESTHESIOLOGY | Facility: HOSPITAL | Age: 39
End: 2019-11-14

## 2019-11-14 VITALS
DIASTOLIC BLOOD PRESSURE: 76 MMHG | BODY MASS INDEX: 23.19 KG/M2 | SYSTOLIC BLOOD PRESSURE: 124 MMHG | WEIGHT: 153 LBS | HEIGHT: 68 IN | HEART RATE: 85 BPM

## 2019-11-14 DIAGNOSIS — N83.202 CYST OF LEFT OVARY: Primary | ICD-10-CM

## 2019-11-14 DIAGNOSIS — Z3A.10 10 WEEKS GESTATION OF PREGNANCY: ICD-10-CM

## 2019-11-14 DIAGNOSIS — O34.219 HISTORY OF CESAREAN DELIVERY, ANTEPARTUM: ICD-10-CM

## 2019-11-14 DIAGNOSIS — Z3A.13 13 WEEKS GESTATION OF PREGNANCY: ICD-10-CM

## 2019-11-14 DIAGNOSIS — Z13.79 GENETIC SCREENING: ICD-10-CM

## 2019-11-14 PROCEDURE — 76813 OB US NUCHAL MEAS 1 GEST: CPT | Performed by: OBSTETRICS & GYNECOLOGY

## 2019-11-14 PROCEDURE — 99202 OFFICE O/P NEW SF 15 MIN: CPT | Performed by: OBSTETRICS & GYNECOLOGY

## 2019-11-14 NOTE — LETTER
November 18, 2019     Tash Farmer MD  [address]    Patient: Seamus Garzon   YOB: 1980   Date of Visit: 11/14/2019       Dear Dr Jovanny Barroso: Thank you for referring Seamus Garzon to me for evaluation  Below are my notes for this consultation  If you have questions, please do not hesitate to call me  I look forward to following your patient along with you  Sincerely,        Reese Ferrell MD        CC: No Recipients    An ultrasound for viability, dating and nuchal translucency was completed today  See Ob Procedures in EPIC  1  Live, seaman fetus with size = dates; VICKY 05 19 2020   Normal nuchal translucency  Left ovarian cyst with no septum p or papillae observed today  Placental lake of unknown significance  Today's ultrasound findings and suggested follow-up were discussed  with the patient  We discussed the option of non invasive prenatal testing (NIPT) using cell free DNA analysis, which has a 99% sensitivity for detection of Down syndrome, with a less than 1% false positive rate  We also discussed that definitive prenatal diagnosis is possible only through genetic amniocentesis or chorionic villus sampling  The patient had a venous blood draw for an NIPT in the [facility]  today  Results will be available in about 10 days  Ob Hx:   2013: term delivery via C section 7lb baby boy  2016: Term delivery at 40 weeks vaginallly, 7 lb, baby boy  2018: term delivery at 40 weeks, 7lb 6 oz, vaginally, baby girl  2019: current pregnancy  Medical hx: negative      Surgical hx: negative      Medications:  Vit D, Vit B12, PNV      Allergies: none      Family Hx: non contributory      Social Hx: No tobacco alcohol or illicit drug use  I reviewed the results of this ultrasound with Ms Jasper Mariscal  and answered her questions  Recommendations:      1  Follow up MSAFP testing is recommended between 16 and 20 weeks gestation  2  Fetal Level II ultrasound imaging is scheduled at about 20 weeks gestation  In addition to review of the ultrasound results I completed a consultation in 20 minutes with > 50% in direct face to face contact and coordination of a plan of care  Thank you for referring your patient to our offices  The limitations of ultrasound to detect all anomalies was reviewed and how it is not  a test to rule out aneuploidy  If you have any further questions do not hesitate to contact us as [phone]      Reese Ferrell MD

## 2019-11-18 PROBLEM — R20.2 RIGHT HAND PARESTHESIA: Status: RESOLVED | Noted: 2018-12-12 | Resolved: 2019-11-18

## 2019-11-18 PROBLEM — Z3A.13 13 WEEKS GESTATION OF PREGNANCY: Status: ACTIVE | Noted: 2019-11-18

## 2019-11-18 PROBLEM — M75.81 RIGHT ROTATOR CUFF TENDINITIS: Status: RESOLVED | Noted: 2018-12-12 | Resolved: 2019-11-18

## 2019-11-18 PROBLEM — O09.523 ELDERLY MULTIGRAVIDA IN THIRD TRIMESTER: Status: RESOLVED | Noted: 2018-04-12 | Resolved: 2019-11-18

## 2019-11-18 PROBLEM — Z3A.39 39 WEEKS GESTATION OF PREGNANCY: Status: RESOLVED | Noted: 2018-05-24 | Resolved: 2019-11-18

## 2019-11-18 PROBLEM — Z13.79 GENETIC SCREENING: Status: ACTIVE | Noted: 2019-11-18

## 2019-11-18 PROBLEM — N83.209 OVARIAN CYST: Status: ACTIVE | Noted: 2019-11-18

## 2019-11-25 ENCOUNTER — TELEPHONE (OUTPATIENT)
Dept: PERINATAL CARE | Facility: CLINIC | Age: 39
End: 2019-11-25

## 2019-11-25 DIAGNOSIS — S90.122A CONTUSION OF FIFTH TOE OF LEFT FOOT, INITIAL ENCOUNTER: Primary | ICD-10-CM

## 2019-11-25 NOTE — LETTER
11/25/19  Amira Jesus  1980    Thank you for completing the cell-free DNA screen ("non-invasive prenatal screening" or "[id number]")  To obtain comprehensive screening results, please complete blood work for MSAFP (maternal-serum alpha fetoprotein) between the weeks of 12/1/2019 to 12/29/2019  Based on your insurance coverage, please use one of the following locations  Call our office for any questions at [phone]      [address] [place] [address] Avenue  1492 [facility], [facility], [address]    300 [facility], [facility], [address]  Phone: [phone]      Phone:  [phone]    [facility] 82  [address] [facility],[address] [facility], [address]  Phone: [phone]      Phone:  [phone] (*ask for lab)    [address]  [address] [facility] [address],[facility], [address]   1401 [facility] 6    Phone: [phone]      Phone:  [address]  Eze Kulwinder, [address], [address] 207 [facility], [facility], [address]   Phone: [phone]      Phone:  [address]  [address], [address]  Phone: [phone]      Phone:  [phone]    Sincerely,    Moshe Dee RN

## 2019-11-25 NOTE — TELEPHONE ENCOUNTER
Pt called office requesting results of [id number]  Results provided  Gender placed in envelope at her requests, she states she will  results later today  MSAFP explained, pt receptive and declines further questions at this time  TRF placed with gender envelope for pt to

## 2019-11-26 ENCOUNTER — ROUTINE PRENATAL (OUTPATIENT)
Dept: OBGYN CLINIC | Facility: CLINIC | Age: 39
End: 2019-11-26

## 2019-11-26 VITALS — DIASTOLIC BLOOD PRESSURE: 76 MMHG | WEIGHT: 150 LBS | SYSTOLIC BLOOD PRESSURE: 122 MMHG | BODY MASS INDEX: 22.81 KG/M2

## 2019-11-26 DIAGNOSIS — Z13.79 GENETIC SCREENING: ICD-10-CM

## 2019-11-26 DIAGNOSIS — Z34.82 PRENATAL CARE, SUBSEQUENT PREGNANCY IN SECOND TRIMESTER: Primary | ICD-10-CM

## 2019-11-26 PROCEDURE — PNV: Performed by: PHYSICIAN ASSISTANT

## 2019-11-27 ENCOUNTER — TELEPHONE (OUTPATIENT)
Dept: PERINATAL CARE | Facility: CLINIC | Age: 39
End: 2019-11-27

## 2019-11-27 NOTE — TELEPHONE ENCOUNTER
Telephone call to patient  She was already aware of negative results  Apologized for the unnecessary call  Confirmed in record that MSAFP taken care of by nurse who provided results

## 2019-12-02 ENCOUNTER — APPOINTMENT (OUTPATIENT)
Dept: LAB | Facility: HOSPITAL | Age: 39
End: 2019-12-02
Attending: OBSTETRICS & GYNECOLOGY
Payer: COMMERCIAL

## 2019-12-02 ENCOUNTER — TRANSCRIBE ORDERS (OUTPATIENT)
Dept: LAB | Facility: HOSPITAL | Age: 39
End: 2019-12-02

## 2019-12-02 DIAGNOSIS — Z36.9 UNSPECIFIED ANTENATAL SCREENING: ICD-10-CM

## 2019-12-02 DIAGNOSIS — Z33.1 PREGNANT STATE, INCIDENTAL: ICD-10-CM

## 2019-12-02 DIAGNOSIS — Z33.1 PREGNANT STATE, INCIDENTAL: Primary | ICD-10-CM

## 2019-12-02 DIAGNOSIS — Z3A.10 10 WEEKS GESTATION OF PREGNANCY: ICD-10-CM

## 2019-12-02 PROCEDURE — 86787 VARICELLA-ZOSTER ANTIBODY: CPT

## 2019-12-02 PROCEDURE — 82105 ALPHA-FETOPROTEIN SERUM: CPT

## 2019-12-02 PROCEDURE — 36415 COLL VENOUS BLD VENIPUNCTURE: CPT

## 2019-12-03 LAB — VZV IGG SER IA-ACNC: NORMAL

## 2019-12-04 LAB
2ND TRIMESTER 4 SCREEN SERPL-IMP: NORMAL
AFP ADJ MOM SERPL: 1.53
AFP INTERP AMN-IMP: NORMAL
AFP INTERP SERPL-IMP: NORMAL
AFP INTERP SERPL-IMP: NORMAL
AFP SERPL-MCNC: 49.9 NG/ML
AGE AT DELIVERY: 40.4 YR
GA METHOD: NORMAL
GA: 16 WEEKS
IDDM PATIENT QL: NO
MULTIPLE PREGNANCY: NO
NEURAL TUBE DEFECT RISK FETUS: 2523 %

## 2019-12-05 ENCOUNTER — TELEPHONE (OUTPATIENT)
Dept: PERINATAL CARE | Facility: CLINIC | Age: 39
End: 2019-12-05

## 2019-12-05 NOTE — TELEPHONE ENCOUNTER
----- Message from Hernán Venegas MD sent at 12/4/2019  5:01 PM EST -----  I have reviewed the patient's lab results which are normal   Please contact the patient to inform her of the normal results        Hernán Venegas MD

## 2019-12-26 ENCOUNTER — ROUTINE PRENATAL (OUTPATIENT)
Dept: OBGYN CLINIC | Facility: CLINIC | Age: 39
End: 2019-12-26

## 2019-12-26 VITALS
BODY MASS INDEX: 24.25 KG/M2 | WEIGHT: 160 LBS | HEIGHT: 68 IN | SYSTOLIC BLOOD PRESSURE: 110 MMHG | DIASTOLIC BLOOD PRESSURE: 60 MMHG

## 2019-12-26 DIAGNOSIS — Z3A.19 19 WEEKS GESTATION OF PREGNANCY: Primary | ICD-10-CM

## 2019-12-26 PROCEDURE — PNV: Performed by: OBSTETRICS & GYNECOLOGY

## 2019-12-26 NOTE — PROGRESS NOTES
Doing well  No bleeding no leaking  Has  appointment for level 2 ultrasound on       Breast-feeding discussed

## 2020-01-03 ENCOUNTER — ROUTINE PRENATAL (OUTPATIENT)
Dept: PERINATAL CARE | Facility: CLINIC | Age: 40
End: 2020-01-03
Payer: COMMERCIAL

## 2020-01-03 VITALS
WEIGHT: 163.4 LBS | SYSTOLIC BLOOD PRESSURE: 111 MMHG | HEIGHT: 68 IN | DIASTOLIC BLOOD PRESSURE: 71 MMHG | BODY MASS INDEX: 24.77 KG/M2 | HEART RATE: 85 BPM

## 2020-01-03 DIAGNOSIS — Z13.79 GENETIC SCREENING: ICD-10-CM

## 2020-01-03 DIAGNOSIS — Z36.86 ENCOUNTER FOR ANTENATAL SCREENING FOR CERVICAL LENGTH: ICD-10-CM

## 2020-01-03 DIAGNOSIS — Z3A.20 20 WEEKS GESTATION OF PREGNANCY: ICD-10-CM

## 2020-01-03 DIAGNOSIS — O09.522 MULTIGRAVIDA OF ADVANCED MATERNAL AGE IN SECOND TRIMESTER: ICD-10-CM

## 2020-01-03 DIAGNOSIS — N83.209 CYST OF OVARY, UNSPECIFIED LATERALITY: ICD-10-CM

## 2020-01-03 DIAGNOSIS — O43.192 PLACENTAL CYST AFFECTING PREGNANCY IN SECOND TRIMESTER: Primary | ICD-10-CM

## 2020-01-03 PROBLEM — O34.219 HISTORY OF CESAREAN SECTION COMPLICATING PREGNANCY: Status: ACTIVE | Noted: 2017-11-16

## 2020-01-03 PROCEDURE — 76811 OB US DETAILED SNGL FETUS: CPT | Performed by: OBSTETRICS & GYNECOLOGY

## 2020-01-03 PROCEDURE — 99212 OFFICE O/P EST SF 10 MIN: CPT | Performed by: OBSTETRICS & GYNECOLOGY

## 2020-01-03 PROCEDURE — 76817 TRANSVAGINAL US OBSTETRIC: CPT | Performed by: OBSTETRICS & GYNECOLOGY

## 2020-01-03 NOTE — PROGRESS NOTES
The patient was seen today for an ultrasound  Please see ultrasound report (located under Ob Procedures) for additional details  Thank you very much for allowing us to participate in the care of this very nice patient  Should you have any questions, please do not hesitate to contact me  MD Cydney Chaseucah  Attending Physician, Dalila

## 2020-01-03 NOTE — PROGRESS NOTES
A transvaginal ultrasound was performed  Sonographer note on use of High Level Disinfection process (Trophon) for transvaginal probe #1 used, serial [id number]   Lazaro CAMP, AGA, RVT

## 2020-01-23 ENCOUNTER — ROUTINE PRENATAL (OUTPATIENT)
Dept: OBGYN CLINIC | Facility: CLINIC | Age: 40
End: 2020-01-23

## 2020-01-23 VITALS — SYSTOLIC BLOOD PRESSURE: 110 MMHG | WEIGHT: 165.5 LBS | BODY MASS INDEX: 25.16 KG/M2 | DIASTOLIC BLOOD PRESSURE: 70 MMHG

## 2020-01-23 DIAGNOSIS — N83.209 CYST OF OVARY, UNSPECIFIED LATERALITY: ICD-10-CM

## 2020-01-23 DIAGNOSIS — O43.192 PLACENTAL CYST AFFECTING PREGNANCY IN SECOND TRIMESTER: ICD-10-CM

## 2020-01-23 DIAGNOSIS — Z36.9 ANTENATAL SCREENING ENCOUNTER: ICD-10-CM

## 2020-01-23 DIAGNOSIS — Z34.82 PRENATAL CARE, SUBSEQUENT PREGNANCY IN SECOND TRIMESTER: Primary | ICD-10-CM

## 2020-01-23 DIAGNOSIS — Z13.79 GENETIC SCREENING: ICD-10-CM

## 2020-01-23 PROCEDURE — PNV: Performed by: PHYSICIAN ASSISTANT

## 2020-01-28 ENCOUNTER — TELEPHONE (OUTPATIENT)
Dept: OBGYN CLINIC | Facility: CLINIC | Age: 40
End: 2020-01-28

## 2020-01-28 NOTE — TELEPHONE ENCOUNTER
Pt spoke with Dr Sakshi Ruffin last night  She is out of town and has an upper respiratory infection  Per Dr Sakshi Ruffin I called in Amoxicillin 250mg QID for 7 days to [facility] pharmacy [phone]  Pt was advised

## 2020-02-03 ENCOUNTER — HOSPITAL ENCOUNTER (OUTPATIENT)
Dept: RADIOLOGY | Facility: HOSPITAL | Age: 40
Discharge: HOME | End: 2020-02-03
Attending: THORACIC SURGERY (CARDIOTHORACIC VASCULAR SURGERY)
Payer: COMMERCIAL

## 2020-02-03 ENCOUNTER — TRANSCRIBE ORDERS (OUTPATIENT)
Dept: RADIOLOGY | Facility: HOSPITAL | Age: 40
End: 2020-02-03

## 2020-02-03 DIAGNOSIS — R07.81 RIB PAIN ON LEFT SIDE: Primary | ICD-10-CM

## 2020-02-03 DIAGNOSIS — R07.81 RIB PAIN ON LEFT SIDE: ICD-10-CM

## 2020-02-03 PROCEDURE — 71046 X-RAY EXAM CHEST 2 VIEWS: CPT

## 2020-02-03 PROCEDURE — 71100 X-RAY EXAM RIBS UNI 2 VIEWS: CPT

## 2020-02-03 RX ORDER — LIDOCAINE 50 MG/G
1 PATCH TOPICAL DAILY
Qty: 30 PATCH | Refills: 0 | Status: ON HOLD | OUTPATIENT
Start: 2020-02-03 | End: 2020-03-11

## 2020-02-03 RX ORDER — LIDOCAINE 50 MG/G
1 PATCH TOPICAL DAILY
Qty: 30 PATCH | Refills: 0 | Status: SHIPPED | OUTPATIENT
Start: 2020-02-03 | End: 2020-02-03 | Stop reason: SDUPTHER

## 2020-02-13 ENCOUNTER — ULTRASOUND (OUTPATIENT)
Dept: PERINATAL CARE | Facility: CLINIC | Age: 40
End: 2020-02-13
Payer: COMMERCIAL

## 2020-02-13 VITALS
WEIGHT: 173.6 LBS | BODY MASS INDEX: 26.31 KG/M2 | HEART RATE: 77 BPM | HEIGHT: 68 IN | SYSTOLIC BLOOD PRESSURE: 119 MMHG | DIASTOLIC BLOOD PRESSURE: 58 MMHG

## 2020-02-13 DIAGNOSIS — O43.192 PLACENTAL CYST AFFECTING PREGNANCY IN SECOND TRIMESTER: ICD-10-CM

## 2020-02-13 DIAGNOSIS — Z3A.26 26 WEEKS GESTATION OF PREGNANCY: ICD-10-CM

## 2020-02-13 DIAGNOSIS — O09.522 MULTIGRAVIDA OF ADVANCED MATERNAL AGE IN SECOND TRIMESTER: Primary | ICD-10-CM

## 2020-02-13 PROBLEM — Z13.79 GENETIC SCREENING: Status: RESOLVED | Noted: 2019-11-18 | Resolved: 2020-02-13

## 2020-02-13 PROCEDURE — 76816 OB US FOLLOW-UP PER FETUS: CPT | Performed by: OBSTETRICS & GYNECOLOGY

## 2020-02-13 PROCEDURE — 99212 OFFICE O/P EST SF 10 MIN: CPT | Performed by: OBSTETRICS & GYNECOLOGY

## 2020-02-13 NOTE — PROGRESS NOTES
The patient was seen today for an ultrasound  Please see ultrasound report (located under Ob Procedures) for additional details  Thank you very much for allowing us to participate in the care of this very nice patient  Should you have any questions, please do not hesitate to contact me  Dangelo Vaca MD [phone]  Attending Physician, Dalila

## 2020-02-13 NOTE — PATIENT INSTRUCTIONS
Kick Counts in Pregnancy   AMBULATORY CARE:   Kick counts  measure how much your baby is moving in your womb  A kick from your baby can be felt as a twist, turn, swish, roll, or jab  It is common to feel your baby kicking at 26 to 28 weeks of pregnancy  You may feel your baby kick as early as 20 weeks of pregnancy  Seek care immediately if:   · You feel your baby kick less as the day goes on      · You do not feel any kicks in a day  Contact your healthcare provider if:   · You feel a change in the number of kicks or movements of your baby  · You feel fewer than 10 kicks within 2 hours after counting  · You have questions or concerns about your baby's movements  Why measure kick counts:  Your baby's movement may provide information about your baby's health  He may move less, or not at all, if there are problems  He may move less if he does not have enough room to grow in your uterus (womb)  He may also move less if he is not getting enough oxygen or nutrition from the placenta  Tell your healthcare provider as soon as you feel a change in your baby's movements  Problems that are found earlier are easier to treat  When to measure kick counts:   · Measure kick counts at the same time every day  · Measure kick counts when your baby is awake and most active  Your baby may be most active in the evening  · Measure kick counts after a meal or snack or when your baby is usually most active  Your baby may be more active after you eat or in the evening  · You should not smoke while you are pregnant  Smoking increases the risk of health problems for you and for your baby during your pregnancy  If you do smoke, wait 2 hours to measure kick counts  Nicotine can make your baby more active than usual   How to measure kick counts:  Check that your baby is awake before you measure kick counts  You can wake up your baby by lightly pushing on your belly, walking, or drinking something cold   Your healthcare provider may tell you different ways to measure kick counts  He/She may tell you to do the following:  · Use a chart or clock to keep track of the time you start and finish counting  · Sit in a chair or lie on your left side  · Place your hands on the largest part of your belly  · Count until you reach 10 kicks  Write down how much time it takes to count 10 kicks  · It may take 30 minutes to 2 hours to count 10 kicks  It should not take more than 2 hours to count 10 kicks  If you do not feel 10 kicks within 2 hours, Call your Obstetrician    Follow up with your healthcare provider as directed:  Write down your questions so you remember to ask them during your visits  © 2017 [address] Information is for End User's use only and may not be sold, redistributed or otherwise used for commercial purposes  All illustrations and images included in CareNotes® are the copyrighted property of A D A M , Inc  or Claudio Pandey  The above information is an  only  It is not intended as medical advice for individual conditions or treatments  Talk to your doctor, nurse or pharmacist before following any medical regimen to see if it is safe and effective for you

## 2020-02-17 ENCOUNTER — APPOINTMENT (OUTPATIENT)
Dept: LAB | Facility: CLINIC | Age: 40
End: 2020-02-17
Payer: COMMERCIAL

## 2020-02-17 DIAGNOSIS — Z36.9 ANTENATAL SCREENING ENCOUNTER: ICD-10-CM

## 2020-02-17 LAB
BASOPHILS # BLD AUTO: 0.04 THOUSANDS/ΜL (ref 0–0.1)
BASOPHILS NFR BLD AUTO: 1 % (ref 0–1)
EOSINOPHIL # BLD AUTO: 0.19 THOUSAND/ΜL (ref 0–0.61)
EOSINOPHIL NFR BLD AUTO: 3 % (ref 0–6)
ERYTHROCYTE [DISTWIDTH] IN BLOOD BY AUTOMATED COUNT: 13.3 % (ref 11.6–15.1)
GLUCOSE 1H P 50 G GLC PO SERPL-MCNC: 99 MG/DL
HCT VFR BLD AUTO: 35.3 % (ref 34.8–46.1)
HGB BLD-MCNC: 11.6 G/DL (ref 11.5–15.4)
IMM GRANULOCYTES # BLD AUTO: 0.04 THOUSAND/UL (ref 0–0.2)
IMM GRANULOCYTES NFR BLD AUTO: 1 % (ref 0–2)
LYMPHOCYTES # BLD AUTO: 2.04 THOUSANDS/ΜL (ref 0.6–4.47)
LYMPHOCYTES NFR BLD AUTO: 26 % (ref 14–44)
MCH RBC QN AUTO: 29.9 PG (ref 26.8–34.3)
MCHC RBC AUTO-ENTMCNC: 32.9 G/DL (ref 31.4–37.4)
MCV RBC AUTO: 91 FL (ref 82–98)
MONOCYTES # BLD AUTO: 0.62 THOUSAND/ΜL (ref 0.17–1.22)
MONOCYTES NFR BLD AUTO: 8 % (ref 4–12)
NEUTROPHILS # BLD AUTO: 4.82 THOUSANDS/ΜL (ref 1.85–7.62)
NEUTS SEG NFR BLD AUTO: 61 % (ref 43–75)
NRBC BLD AUTO-RTO: 0 /100 WBCS
PLATELET # BLD AUTO: 233 THOUSANDS/UL (ref 149–390)
PMV BLD AUTO: 10.8 FL (ref 8.9–12.7)
RBC # BLD AUTO: 3.88 MILLION/UL (ref 3.81–5.12)
WBC # BLD AUTO: 7.75 THOUSAND/UL (ref 4.31–10.16)

## 2020-02-17 PROCEDURE — 82950 GLUCOSE TEST: CPT

## 2020-02-17 PROCEDURE — 36415 COLL VENOUS BLD VENIPUNCTURE: CPT

## 2020-02-17 PROCEDURE — 85025 COMPLETE CBC W/AUTO DIFF WBC: CPT

## 2020-02-17 PROCEDURE — 86592 SYPHILIS TEST NON-TREP QUAL: CPT

## 2020-02-18 LAB — RPR SER QL: NORMAL

## 2020-02-20 ENCOUNTER — ROUTINE PRENATAL (OUTPATIENT)
Dept: OBGYN CLINIC | Facility: CLINIC | Age: 40
End: 2020-02-20

## 2020-02-20 VITALS — WEIGHT: 173.5 LBS | SYSTOLIC BLOOD PRESSURE: 120 MMHG | BODY MASS INDEX: 26.38 KG/M2 | DIASTOLIC BLOOD PRESSURE: 70 MMHG

## 2020-02-20 DIAGNOSIS — Z34.82 PRENATAL CARE, SUBSEQUENT PREGNANCY IN SECOND TRIMESTER: Primary | ICD-10-CM

## 2020-02-20 DIAGNOSIS — O43.192 PLACENTAL CYST AFFECTING PREGNANCY IN SECOND TRIMESTER: ICD-10-CM

## 2020-02-20 PROCEDURE — PNV: Performed by: PHYSICIAN ASSISTANT

## 2020-02-20 NOTE — PROGRESS NOTES
No c/o--feeling well  URI totally resolved--ribs feeling better  1 hour=99  Hgb=11 6  Blood type P pos  PNC following placental cyst--f/u in 2 5 weeks

## 2020-03-05 ENCOUNTER — ULTRASOUND (OUTPATIENT)
Dept: PERINATAL CARE | Facility: OTHER | Age: 40
End: 2020-03-05
Payer: COMMERCIAL

## 2020-03-05 VITALS
SYSTOLIC BLOOD PRESSURE: 110 MMHG | WEIGHT: 178.79 LBS | DIASTOLIC BLOOD PRESSURE: 62 MMHG | BODY MASS INDEX: 27.1 KG/M2 | HEIGHT: 68 IN | HEART RATE: 92 BPM

## 2020-03-05 DIAGNOSIS — O09.523 MULTIGRAVIDA OF ADVANCED MATERNAL AGE IN THIRD TRIMESTER: ICD-10-CM

## 2020-03-05 DIAGNOSIS — Z3A.29 29 WEEKS GESTATION OF PREGNANCY: ICD-10-CM

## 2020-03-05 DIAGNOSIS — O43.193 PLACENTAL CYST AFFECTING PREGNANCY IN THIRD TRIMESTER: Primary | ICD-10-CM

## 2020-03-05 PROCEDURE — 76818 FETAL BIOPHYS PROFILE W/NST: CPT | Performed by: OBSTETRICS & GYNECOLOGY

## 2020-03-05 PROCEDURE — 99212 OFFICE O/P EST SF 10 MIN: CPT | Performed by: OBSTETRICS & GYNECOLOGY

## 2020-03-05 PROCEDURE — 76816 OB US FOLLOW-UP PER FETUS: CPT | Performed by: OBSTETRICS & GYNECOLOGY

## 2020-03-05 NOTE — PROGRESS NOTES
The patient was seen today for an ultrasound  Please see ultrasound report (located under Ob Procedures) for additional details  Thank you very much for allowing us to participate in the care of this very nice patient  Should you have any questions, please do not hesitate to contact me  Dangelo Hill MD [phone]  Attending Physician, Dalila

## 2020-03-05 NOTE — LETTER
NST sleeve cover sheet    Patient name: Sandra Ball  : 1980  MRN: [id number]    VICKY: Estimated Date of Delivery: 20    Obstetrician: Ayad Aly    Reason(s) for testing:  Placental cyst  __________________________________________      Testing frequency:    __X_ 2x/wk  ___ 1x/wk  ___ Dopplers  ___ BPP?       Last growth scan: __________________________________________

## 2020-03-09 ENCOUNTER — ROUTINE PRENATAL (OUTPATIENT)
Dept: PERINATAL CARE | Facility: CLINIC | Age: 40
End: 2020-03-09
Payer: COMMERCIAL

## 2020-03-09 ENCOUNTER — TELEPHONE (OUTPATIENT)
Dept: OBGYN CLINIC | Facility: CLINIC | Age: 40
End: 2020-03-09

## 2020-03-09 ENCOUNTER — TELEPHONE (OUTPATIENT)
Dept: PERINATAL CARE | Facility: CLINIC | Age: 40
End: 2020-03-09

## 2020-03-09 VITALS
HEART RATE: 112 BPM | WEIGHT: 178.6 LBS | SYSTOLIC BLOOD PRESSURE: 124 MMHG | DIASTOLIC BLOOD PRESSURE: 68 MMHG | HEIGHT: 68 IN | BODY MASS INDEX: 27.07 KG/M2

## 2020-03-09 DIAGNOSIS — O09.521 SUPERVISION OF ELDERLY MULTIGRAVIDA, FIRST TRIMESTER: Primary | ICD-10-CM

## 2020-03-09 DIAGNOSIS — O43.193 PLACENTAL CYST AFFECTING PREGNANCY IN THIRD TRIMESTER: ICD-10-CM

## 2020-03-09 DIAGNOSIS — Z3A.29 29 WEEKS GESTATION OF PREGNANCY: ICD-10-CM

## 2020-03-09 PROBLEM — R03.0 ELEVATED BLOOD PRESSURE READING: Status: ACTIVE | Noted: 2020-03-09

## 2020-03-09 PROCEDURE — 59025 FETAL NON-STRESS TEST: CPT | Performed by: OBSTETRICS & GYNECOLOGY

## 2020-03-09 NOTE — TELEPHONE ENCOUNTER
Patient requested her records be faxed to [facility] for second opinion  Release form signed and records faxed to [facility] at [address]  Patient notified of completion

## 2020-03-09 NOTE — TELEPHONE ENCOUNTER
Pt called-30 weeks pregnant-can she fly on Friday 3/3/20 to [place] on a 1 1/2 hr flight and would she need a note?

## 2020-03-09 NOTE — LETTER
Amira Davian Layne  MRN: [id number] MRN: [id number] MRN: [id number]  : [birth year]  VICKY/GA: VICKY/GA: VICKY/GA:  Date:  Date:  Date:     [facility]  MRN: [id number] MRN: [id number] MRN: [id number]  : [birth year]  VICKY/GA: VICKY/GA: VICKY/GA:  Date:  Date:  Date:     [facility]  MRN: [id number] MRN: [id number] MRN: [id number]  : [birth year]  VICKY/GA: VICKY/GA: VICKY/GA:  Date:  Date:  Date:     [facility]  MRN: [id number] MRN: [id number] MRN: [id number]  : [birth year]  VICKY/GA: VICKY/GA: VICKY/GA:  Date:  Date:  Date:     [facility]  MRN: [id number] MRN: [id number] MRN: [id number]  : [birth year]  VICKY/GA: VICKY/GA: VICKY/GA:  Date:  Date:  Date:     [facility]  MRN: [id number] MRN: [id number] MRN: [id number]  : [birth year]  VICKY/GA: VICKY/GA: VICKY/GA:  Date:  Date:  Date:     [facility]  MRN: [id number] MRN: [id number] MRN: [id number]  : [birth year]  VICKY/GA: VICKY/GA: VICKY/GA:  Date:  Date:  Date:

## 2020-03-09 NOTE — LETTER
NST sleeve cover sheet    Patient name: Allen Tamez  : 1980  MRN: [id number]    VICKY: Estimated Date of Delivery: 20    Obstetrician: Ratna Paz    Reason(s) for testing:  Placental Cyst  __________________________________________      Testing frequency:    __X_ 2x/wk  ___ 1x/wk  ___ Dopplers  ___ BPP?       Last growth scan: __________________________________________

## 2020-03-10 NOTE — PROGRESS NOTES
[address]: Ms Emerson Patel was seen today at 29w6d for NST (found under the pregnancy episode) which I reviewed the RN assessment and agree  Mild range BP, subsequently normalized  OK to continue outpatient antepartum fetal surveillance  Advised to notify Dr Lazaro Astorga if any recurrence of pain      Please don't hesitate to contact our office with any concerns or questions   -Norma Butler MD

## 2020-03-11 ENCOUNTER — HOSPITAL ENCOUNTER (OUTPATIENT)
Facility: HOSPITAL | Age: 40
End: 2020-03-11
Attending: OBSTETRICS & GYNECOLOGY | Admitting: OBSTETRICS & GYNECOLOGY
Payer: COMMERCIAL

## 2020-03-11 ENCOUNTER — HOSPITAL ENCOUNTER (OUTPATIENT)
Facility: HOSPITAL | Age: 40
Discharge: HOME | End: 2020-03-11
Attending: OBSTETRICS & GYNECOLOGY | Admitting: OBSTETRICS & GYNECOLOGY
Payer: COMMERCIAL

## 2020-03-11 VITALS — DIASTOLIC BLOOD PRESSURE: 64 MMHG | SYSTOLIC BLOOD PRESSURE: 121 MMHG | TEMPERATURE: 97.6 F

## 2020-03-11 PROBLEM — O47.03 PRETERM UTERINE CONTRACTIONS IN THIRD TRIMESTER, ANTEPARTUM: Status: ACTIVE | Noted: 2020-03-11

## 2020-03-11 PROBLEM — Z3A.30 30 WEEKS GESTATION OF PREGNANCY: Status: ACTIVE | Noted: 2019-11-18

## 2020-03-11 LAB
ABO GROUP BLD: NORMAL
ALBUMIN SERPL BCP-MCNC: 2.8 G/DL (ref 3.5–5)
ALP SERPL-CCNC: 84 U/L (ref 46–116)
ALT SERPL W P-5'-P-CCNC: 17 U/L (ref 12–78)
ANION GAP SERPL CALCULATED.3IONS-SCNC: 11 MMOL/L (ref 4–13)
APTT PPP: 28 SECONDS (ref 23–37)
AST SERPL W P-5'-P-CCNC: 21 U/L (ref 5–45)
B-HCG SERPL-ACNC: 1564 MIU/ML
BASOPHILS # BLD AUTO: 0.03 THOUSANDS/ΜL (ref 0–0.1)
BASOPHILS NFR BLD AUTO: 0 % (ref 0–1)
BILIRUB SERPL-MCNC: 0.32 MG/DL (ref 0.2–1)
BLD GP AB SCN SERPL QL: NEGATIVE
BUN SERPL-MCNC: 9 MG/DL (ref 5–25)
CALCIUM SERPL-MCNC: 8.2 MG/DL (ref 8.3–10.1)
CHLORIDE SERPL-SCNC: 102 MMOL/L (ref 100–108)
CO2 SERPL-SCNC: 22 MMOL/L (ref 21–32)
CREAT SERPL-MCNC: 0.5 MG/DL (ref 0.6–1.3)
CREAT UR-MCNC: 217 MG/DL
EOSINOPHIL # BLD AUTO: 0.13 THOUSAND/ΜL (ref 0–0.61)
EOSINOPHIL NFR BLD AUTO: 2 % (ref 0–6)
ERYTHROCYTE [DISTWIDTH] IN BLOOD BY AUTOMATED COUNT: 12.8 % (ref 11.6–15.1)
FIBRINOGEN PPP-MCNC: 463 MG/DL (ref 227–495)
FIBRONECTIN FETAL VAG QL: NEGATIVE
GFR SERPL CREATININE-BSD FRML MDRD: 121 ML/MIN/1.73SQ M
GLUCOSE SERPL-MCNC: 102 MG/DL (ref 65–140)
HCT VFR BLD AUTO: 34.8 % (ref 34.8–46.1)
HGB BLD-MCNC: 12.1 G/DL (ref 11.5–15.4)
IMM GRANULOCYTES # BLD AUTO: 0.06 THOUSAND/UL (ref 0–0.2)
IMM GRANULOCYTES NFR BLD AUTO: 1 % (ref 0–2)
INR PPP: 0.98 (ref 0.84–1.19)
LYMPHOCYTES # BLD AUTO: 2.04 THOUSANDS/ΜL (ref 0.6–4.47)
LYMPHOCYTES NFR BLD AUTO: 24 % (ref 14–44)
MCH RBC QN AUTO: 29.9 PG (ref 26.8–34.3)
MCHC RBC AUTO-ENTMCNC: 34.8 G/DL (ref 31.4–37.4)
MCV RBC AUTO: 86 FL (ref 82–98)
MONOCYTES # BLD AUTO: 0.74 THOUSAND/ΜL (ref 0.17–1.22)
MONOCYTES NFR BLD AUTO: 9 % (ref 4–12)
NEUTROPHILS # BLD AUTO: 5.61 THOUSANDS/ΜL (ref 1.85–7.62)
NEUTS SEG NFR BLD AUTO: 64 % (ref 43–75)
NRBC BLD AUTO-RTO: 0 /100 WBCS
PLATELET # BLD AUTO: 233 THOUSANDS/UL (ref 149–390)
PLATELET # BLD AUTO: 233 THOUSANDS/UL (ref 149–390)
PMV BLD AUTO: 9.5 FL (ref 8.9–12.7)
PMV BLD AUTO: 9.5 FL (ref 8.9–12.7)
POTASSIUM SERPL-SCNC: 3.7 MMOL/L (ref 3.5–5.3)
PROT SERPL-MCNC: 6.8 G/DL (ref 6.4–8.2)
PROT UR-MCNC: 19 MG/DL
PROT/CREAT UR: 0.09 MG/G{CREAT} (ref 0–0.1)
PROTHROMBIN TIME: 12.4 SECONDS (ref 11.6–14.5)
RBC # BLD AUTO: 4.05 MILLION/UL (ref 3.81–5.12)
RH BLD: POSITIVE
SODIUM SERPL-SCNC: 135 MMOL/L (ref 136–145)
SPECIMEN EXPIRATION DATE: NORMAL
THROMBIN TIME: 15.1 SECONDS (ref 14.7–18.4)
URATE SERPL-MCNC: 4.7 MG/DL (ref 2–6.8)
WBC # BLD AUTO: 8.61 THOUSAND/UL (ref 4.31–10.16)

## 2020-03-11 PROCEDURE — 85384 FIBRINOGEN ACTIVITY: CPT | Performed by: OBSTETRICS & GYNECOLOGY

## 2020-03-11 PROCEDURE — 76817 TRANSVAGINAL US OBSTETRIC: CPT | Performed by: OBSTETRICS & GYNECOLOGY

## 2020-03-11 PROCEDURE — 86901 BLOOD TYPING SEROLOGIC RH(D): CPT | Performed by: OBSTETRICS & GYNECOLOGY

## 2020-03-11 PROCEDURE — 86900 BLOOD TYPING SEROLOGIC ABO: CPT | Performed by: OBSTETRICS & GYNECOLOGY

## 2020-03-11 PROCEDURE — 76816 OB US FOLLOW-UP PER FETUS: CPT | Performed by: OBSTETRICS & GYNECOLOGY

## 2020-03-11 PROCEDURE — 99241 PR OFFICE CONSULTATION NEW/ESTAB PATIENT 15 MIN: CPT | Performed by: OBSTETRICS & GYNECOLOGY

## 2020-03-11 PROCEDURE — 85025 COMPLETE CBC W/AUTO DIFF WBC: CPT | Performed by: OBSTETRICS & GYNECOLOGY

## 2020-03-11 PROCEDURE — 59025 FETAL NON-STRESS TEST: CPT | Performed by: OBSTETRICS & GYNECOLOGY

## 2020-03-11 PROCEDURE — 82570 ASSAY OF URINE CREATININE: CPT | Performed by: OBSTETRICS & GYNECOLOGY

## 2020-03-11 PROCEDURE — 84550 ASSAY OF BLOOD/URIC ACID: CPT | Performed by: OBSTETRICS & GYNECOLOGY

## 2020-03-11 PROCEDURE — 80053 COMPREHEN METABOLIC PANEL: CPT | Performed by: OBSTETRICS & GYNECOLOGY

## 2020-03-11 PROCEDURE — 85610 PROTHROMBIN TIME: CPT | Performed by: OBSTETRICS & GYNECOLOGY

## 2020-03-11 PROCEDURE — 85730 THROMBOPLASTIN TIME PARTIAL: CPT | Performed by: OBSTETRICS & GYNECOLOGY

## 2020-03-11 PROCEDURE — 82731 ASSAY OF FETAL FIBRONECTIN: CPT | Performed by: OBSTETRICS & GYNECOLOGY

## 2020-03-11 PROCEDURE — 86850 RBC ANTIBODY SCREEN: CPT | Performed by: OBSTETRICS & GYNECOLOGY

## 2020-03-11 PROCEDURE — 84156 ASSAY OF PROTEIN URINE: CPT | Performed by: OBSTETRICS & GYNECOLOGY

## 2020-03-11 PROCEDURE — NC001 PR NO CHARGE: Performed by: OBSTETRICS & GYNECOLOGY

## 2020-03-11 PROCEDURE — G0463 HOSPITAL OUTPT CLINIC VISIT: HCPCS

## 2020-03-11 PROCEDURE — 99214 OFFICE O/P EST MOD 30 MIN: CPT

## 2020-03-11 PROCEDURE — 85049 AUTOMATED PLATELET COUNT: CPT | Performed by: OBSTETRICS & GYNECOLOGY

## 2020-03-11 PROCEDURE — 99213 OFFICE O/P EST LOW 20 MIN: CPT | Performed by: OBSTETRICS & GYNECOLOGY

## 2020-03-11 PROCEDURE — 84702 CHORIONIC GONADOTROPIN TEST: CPT | Performed by: OBSTETRICS & GYNECOLOGY

## 2020-03-11 PROCEDURE — 85670 THROMBIN TIME PLASMA: CPT | Performed by: OBSTETRICS & GYNECOLOGY

## 2020-03-11 RX ORDER — SODIUM CHLORIDE, SODIUM LACTATE, POTASSIUM CHLORIDE, CALCIUM CHLORIDE 600; 310; 30; 20 MG/100ML; MG/100ML; MG/100ML; MG/100ML
125 INJECTION, SOLUTION INTRAVENOUS CONTINUOUS
Status: DISCONTINUED | OUTPATIENT
Start: 2020-03-11 | End: 2020-03-11 | Stop reason: HOSPADM

## 2020-03-11 RX ADMIN — SODIUM CHLORIDE, SODIUM LACTATE, POTASSIUM CHLORIDE, AND CALCIUM CHLORIDE 125 ML/HR: .6; .31; .03; .02 INJECTION, SOLUTION INTRAVENOUS at 09:13

## 2020-03-11 NOTE — ASSESSMENT & PLAN NOTE
Patient Vitals for the past 24 hrs:   BP Temp Temp src   03/11/20 0855 121/64 97 6 °F (36 4 °C) Temporal     Normotensive today, normal CMP and CBC, note is made of mildly elevated uric acid though not at at the threshold at which concern exists for preeclampsia

## 2020-03-11 NOTE — ASSESSMENT & PLAN NOTE
Measurements today show overall stable  Suggested adding on AFP and hCG to see if either is excessively elevated  Considering second opinion in person (has scan records reviewed by [facility])  Advise continuing previously made plans

## 2020-03-11 NOTE — QUICK NOTE
Patient with irritability on toco, no other findings at this time  Lab Results   Component Value Date    WBC 8 61 03/11/2020    HGB 12 1 03/11/2020    HCT 34 8 03/11/2020    MCV 86 03/11/2020     03/11/2020     03/11/2020     Lab Results   Component Value Date     10/28/2015    SODIUM 135 (L) 03/11/2020    K 3 7 03/11/2020     03/11/2020    CO2 22 03/11/2020    ANIONGAP 6 10/28/2015    AGAP 11 03/11/2020    BUN 9 03/11/2020    CREATININE 0 50 (L) 03/11/2020    GLUC 102 03/11/2020    GLUF 76 10/28/2015    CALCIUM 8 2 (L) 03/11/2020    AST 21 03/11/2020    ALT 17 03/11/2020    ALKPHOS 84 03/11/2020    PROT 7 7 10/28/2015    TP 6 8 03/11/2020    BILITOT 0 70 10/28/2015    TBILI 0 32 03/11/2020    EGFR 121 03/11/2020     Prot/Cr ratio 0 09, uric acid 4 7    ffn pending    Will continue to monitor and hydrate  Await further evaluation with mfm

## 2020-03-11 NOTE — CONSULTS
Consultation - Maternal Fetal Medicine   Seamus Garzon 35 y o  female MRN: [id number]  Unit/Bed#: LD TRIAGE  Encounter: [id number]    Assessment/Plan     Assessment: Ms Jasper Mariscal is a 35y o  year-old [id number] at [facility] Day: 1, admitted for observation for abdominal pain and contractions  By issue:    Placental cyst affecting pregnancy in third trimester  Assessment & Plan  Measurements today show overall stable  Suggested adding on AFP and hCG to see if either is excessively elevated  Considering second opinion in person (has scan records reviewed by [facility])  Advise continuing previously made plans  *  uterine contractions in third trimester, antepartum  Assessment & Plan  Encouraging today is the negative fetal fibronectin test as well as normal cervical length measurement  Both of these suggest a low risk of active  labor today as well as low risk of delivering in the next 2 weeks  Would hold off on betamethasone for this reason  I did suggest that she stop taking 24 hour call shifts  She and her  will work on the logistics of this  Elevated blood pressure reading  Assessment & Plan  Patient Vitals for the past 24 hrs:   BP Temp Temp [facility]   20 0855 121/64 97 6 °F (36 4 °C) Temporal     Normotensive today, normal CMP and CBC, note is made of mildly elevated uric acid though not at at the threshold at which concern exists for preeclampsia  Chief Complaint: Observation    Referring physician:   Tash Farmer Md  [address]  [facility], [address] [facility]    Dear Dr Jovanny Barroso and Monique Bhakta,    Thank you for referring patient Seamus Garzon for Maternal-Fetal Medicine consultation regarding abdominal pain and contractions  She is well know to us and is being followed for placental cyst   HPI: As you know, Ms Jasper Mariscal is a 35y o  year-old P2K5265 [id number] with an VICKY of 2020, by Ultrasound at [address] [place] [address] Day: 1    Contractions and abdominal pain began while working today  Other obstetric review of symptoms:  Contractions: onset during work today  Leakage of fluid: none  Bleeding: None  Fetal movement: present  Pertinent items are noted in HPI  Other history is as follows:    Historical Information   OB History    Para Term  AB Living   4 3 3 0 0 3   SAB TAB Ectopic Multiple Live Births         0 3      # Outcome Date GA Lbr Wilver/2nd Weight Sex Delivery Anes PTL Lv   4 Current            3 Term 18 39w5d / 00:16 3340 g (7 lb 5 8 oz) F Vag-Spont EPI N ERASMO   2 Term 16 40w0d / 03:19 3160 g (6 lb 15 5 oz) M Vag-Spont EPI  ERASMO   1 Term 13 40w0d  4111 g (9 lb 1 oz) M CS-Unspec EPI  ERASMO     Gynecologic history: No LMP recorded  Patient is pregnant  Past Medical History:   Diagnosis Date    Abnormal Pap smear of cervix     Asthma     childhood     3 para 3     History of PCOS     HPV in female     Papanicolaou smear 2018    Polycystic ovary syndrome     Varicella     childhood     Past Surgical History:   Procedure Laterality Date     SECTION  2013    COLPOSCOPY      FOOT SURGERY Right     NOSE SURGERY      VAGINAL DELIVERY       Social History   Social History     Substance and Sexual Activity   Alcohol Use No     Social History     Substance and Sexual Activity   Drug Use No     Social History     Tobacco Use   Smoking Status Never Smoker   Smokeless Tobacco Never Used     Meds/Allergies   Prior to Admission Medications   Prescriptions Last Dose Informant Patient Reported? Taking? Cholecalciferol (VITAMIN D) 2000 UNITS tablet  Self Yes No   Sig: Take 2,000 Units by mouth as needed     Docosahexaenoic Acid (DHA PO)  Self Yes No   Sig: Take 1 tablet by mouth daily  Prenatal Multivit-Min-Fe-FA (PRENATAL 1 + IRON PO)  Self Yes No   Sig: Take by mouth   folic acid (FOLVITE) 1 mg tablet  Self Yes No   Sig: Take 1 mg by mouth daily        Facility-Administered Medications: None     Medication Administration - last 24 hours from 03/10/2020 1342 to 03/11/2020 1342       Date/Time Order Dose Route Action Action by     03/11/2020 1230 lactated ringers infusion 925 mL/hr Intravenous Rate/Dose Change Matteo Cade RN     03/11/2020 0913 lactated ringers infusion 125 mL/hr Intravenous New Bag SANTOSH Mendes        Current Facility-Administered Medications   Medication Dose Route Frequency    lactated ringers infusion  125 mL/hr Intravenous Continuous     Allergies   Allergen Reactions    Seasonal Ic [Cholestatin] Allergic Rhinitis       Objective   Patient Vitals for the past 24 hrs:   BP Temp Temp [facility]   03/11/20 0855 121/64 97 6 °F (36 4 °C) Temporal     Vitals: Blood pressure 121/64, temperature 97 6 °F (36 4 °C), temperature source Temporal, currently breastfeeding  Constitutional: well-developed, well-nourished, in no acute distress, with vitals documented above  Neuro: awake, alert and oriented x3  Psychiatric: mood and affect are appropriate   Skin: skin is intact without rashes or lesions or jaundice    Invasive Devices     Peripheral Intravenous Line            Peripheral IV 03/11/20 Left Hand less than 1 day              Results from last 7 days   Lab Units 03/11/20  0911   WBC Thousand/uL 8 61   NEUTROS ABS Thousands/µL 5 61   HEMOGLOBIN g/dL 12 1   MCV fL 86   PLATELETS Thousands/uL 233  233     Results from last 7 days   Lab Units 03/11/20  0911   NEUTROS PCT % 64   MONOS PCT % 9   EOS PCT % 2     Results from last 7 days   Lab Units 03/11/20  0911   POTASSIUM mmol/L 3 7   CHLORIDE mmol/L 102   CO2 mmol/L 22   BUN mg/dL 9   CREATININE mg/dL 0 50*   EGFR ml/min/1 73sq m 121     Results from last 7 days   Lab Units 03/11/20  0911   AST U/L 21   ALT U/L 17   ALK PHOS U/L 84     Results from last 7 days   Lab Units 03/11/20  0911   PLATELETS Thousands/uL 233  233   INR  0 98   PROTIME seconds 12 4   PTT seconds 28   FIBRINOGEN mg/dL 463         Results from last 7 days   Lab Units 20  0911   PROT/CREAT RATIO UR  0 09     Results from last 7 days   Lab Units 20  0911   URIC ACID mg/dL 4 7             Fetal data:    Time of NST: 8725-3251  NST: reactive    FHR baseline (bpm): 140  Variability (bpm): Moderate 6-25  Accelerations: 15 bpm x 15 secs   Decelerations: none  Contraction Frequency (minutes):  irritable     MFM ultrasound report key findings: normal cervical length, overall stable placental cyst size, some heterogeneous appearance to placenta without concern for infarction, no hydrops     --------------------------------    Future Appointments   Date Time Provider Crystal Hutchins   3/12/2020  9:45  [address]   3/16/2020  3:00 PM [address]   3/19/2020  9:45 AM Antwon Nino MD [facility]   3/19/2020 12:30  [address]   3/23/2020  9:15 AM [address]   3/26/2020 12:30  [facility] [address]   3/26/2020  1:00 PM  [address] [facility]   3/30/2020  9:30 AM [address]     At the conclusion of today's encounter, all questions were answered to her satisfaction  Thank you very much for this kind referral and please do not hesitate to contact me with any further questions or concerns      Sincerely,    Cyndy Paez MD  Attending Physician, Dalila

## 2020-03-11 NOTE — ASSESSMENT & PLAN NOTE
Encouraging today is the negative fetal fibronectin test as well as normal cervical length measurement  Both of these suggest a low risk of active  labor today as well as low risk of delivering in the next 2 weeks  Would hold off on betamethasone for this reason  I did suggest that she stop taking 24 hour call shifts  She and her  will work on the logistics of this

## 2020-03-11 NOTE — PLAN OF CARE
DISCHARGE PLANNING     Discharge to home or other facility with appropriate resources Progressing        INFECTION - ADULT     Absence or prevention of progression during hospitalization Progressing     Absence of fever/infection during neutropenic period Progressing        Knowledge Deficit     Patient/family/caregiver demonstrates understanding of disease process, treatment plan, medications, and discharge instructions Progressing        PAIN - ADULT     Verbalizes/displays adequate comfort level or baseline comfort level Progressing        POSTPARTUM     Experiences normal postpartum course Progressing     Appropriate maternal -  bonding Progressing     Establishment of infant feeding pattern Progressing     Incision(s), wounds(s) or drain site(s) healing without S/S of infection Progressing        SAFETY ADULT     Patient will remain free of falls Progressing     Maintain or return to baseline ADL function Progressing     Maintain or return mobility status to optimal level Progressing Patient made aware of 24/7 emergency services.

## 2020-03-11 NOTE — PROGRESS NOTES
Triage Note - OB  Amira Jesus 35 y o  female MRN: [id number]  Unit/Bed#: LD TRIAGE 4 Encounter: [id number]    Chief Complaint: abdominal pain waking from sleep, had episode this morning at work felt like uterus tightened and would not release, became light headed and nauseous  VICKY: Estimated Date of Delivery: 20    HPI: Patient is a [id number] at 30w1d here complaining of episodes of tight abdominal pain as well as some less severe cramping  Has not had any symptoms like this in any prior pregnancy  Patient reports good fetal movement, no pain with fetal movement, no leaking bleeding or discharge  Patient reports normal voiding and bowel function without discomfort  Usually very busy schedule not sure if could have been having mild cramping unnoticed prior to currently  Had two episodes overnight while sleeping (midnight and 4 am) severe tight pain not resolved with position change and eventually resolved  Vitals: @VS  There is no height or weight on file to calculate BMI  Physical Exam  Lungs: CTA B  Heart: RRR S1 S2  Abd: soft gravid currently non tender, positive bowel sounds    FHR: 130's reactive  [place]: nothing currently visible, adjusting toco    Labs: No results found for this or any previous visit (from the past 24 hour(s))  Lab, Imaging and other studies: I have personally reviewed pertinent reports  A/P: 34 y/o  with placental cyst and one prior c/s here with abdominal pain  1) MFM consulted to assist in evaluation, they will scan and evaluate  2) ffn collected, evaluate for PTL  3) preeclampsia labs drawn as well as coag profile  4) will observe and monitor and await mfm evaluation      Carolyn Carrizales MD  3/11/2020  9:04 AM

## 2020-03-12 ENCOUNTER — TELEPHONE (OUTPATIENT)
Dept: PERINATAL CARE | Facility: OTHER | Age: 40
End: 2020-03-12

## 2020-03-12 ENCOUNTER — ULTRASOUND (OUTPATIENT)
Dept: PERINATAL CARE | Facility: OTHER | Age: 40
End: 2020-03-12
Payer: COMMERCIAL

## 2020-03-12 VITALS
HEIGHT: 68 IN | WEIGHT: 180.12 LBS | HEART RATE: 96 BPM | SYSTOLIC BLOOD PRESSURE: 126 MMHG | BODY MASS INDEX: 27.3 KG/M2 | DIASTOLIC BLOOD PRESSURE: 75 MMHG

## 2020-03-12 DIAGNOSIS — O43.193 PLACENTAL CYST AFFECTING PREGNANCY IN THIRD TRIMESTER: Primary | ICD-10-CM

## 2020-03-12 DIAGNOSIS — Z3A.30 30 WEEKS GESTATION OF PREGNANCY: ICD-10-CM

## 2020-03-12 DIAGNOSIS — O47.03 PRETERM UTERINE CONTRACTIONS IN THIRD TRIMESTER, ANTEPARTUM: ICD-10-CM

## 2020-03-12 PROCEDURE — 76821 MIDDLE CEREBRAL ARTERY ECHO: CPT | Performed by: OBSTETRICS & GYNECOLOGY

## 2020-03-12 PROCEDURE — 99212 OFFICE O/P EST SF 10 MIN: CPT | Performed by: OBSTETRICS & GYNECOLOGY

## 2020-03-12 PROCEDURE — 76818 FETAL BIOPHYS PROFILE W/NST: CPT | Performed by: OBSTETRICS & GYNECOLOGY

## 2020-03-12 NOTE — TELEPHONE ENCOUNTER
Left patient a message at request of Dr Tyra Luciano if she wanted to come in for nst today as she was seen in triage at [address] [place] [address] yesterday  Requested patient to call back

## 2020-03-12 NOTE — PATIENT INSTRUCTIONS

## 2020-03-12 NOTE — PROGRESS NOTES
[address] [place] [address]: Ms Bianca Hunt was seen today at 30w2d for NST, BPP, MCAs  See ultrasound report under "OB Procedures" tab  Please don't hesitate to contact our office with any concerns or questions    Cyndy Paez MD

## 2020-03-16 ENCOUNTER — ROUTINE PRENATAL (OUTPATIENT)
Dept: PERINATAL CARE | Facility: CLINIC | Age: 40
End: 2020-03-16
Payer: COMMERCIAL

## 2020-03-16 ENCOUNTER — TELEPHONE (OUTPATIENT)
Dept: PERINATAL CARE | Facility: CLINIC | Age: 40
End: 2020-03-16

## 2020-03-16 VITALS
WEIGHT: 181.2 LBS | SYSTOLIC BLOOD PRESSURE: 128 MMHG | DIASTOLIC BLOOD PRESSURE: 61 MMHG | BODY MASS INDEX: 27.46 KG/M2 | HEIGHT: 68 IN | HEART RATE: 91 BPM

## 2020-03-16 DIAGNOSIS — O09.523 MULTIGRAVIDA OF ADVANCED MATERNAL AGE IN THIRD TRIMESTER: ICD-10-CM

## 2020-03-16 DIAGNOSIS — O43.193 PLACENTAL CYST AFFECTING PREGNANCY IN THIRD TRIMESTER: Primary | ICD-10-CM

## 2020-03-16 DIAGNOSIS — Z3A.31 31 WEEKS GESTATION OF PREGNANCY: ICD-10-CM

## 2020-03-16 DIAGNOSIS — O34.219 HISTORY OF CESAREAN SECTION COMPLICATING PREGNANCY: ICD-10-CM

## 2020-03-16 PROCEDURE — 59025 FETAL NON-STRESS TEST: CPT | Performed by: OBSTETRICS & GYNECOLOGY

## 2020-03-16 NOTE — LETTER
March 16, 2020     Chucho Villela MD  [address]    Patient: Edwin Park   YOB: 1980   Date of Visit: 3/16/2020       Dear Dr Kamla Butts: Thank you for referring Edwin Park to me for evaluation  Below are my notes for this consultation  If you have questions, please do not hesitate to call me  I look forward to following your patient along with you  Sincerely,        Jenniffer Perkins MD        CC: No Recipients  Jenniffer Perkins MD  3/16/2020  7:11 PM  Sign at close encounter  NST is reactive    Jenniffer HICKMAN

## 2020-03-16 NOTE — PATIENT INSTRUCTIONS

## 2020-03-18 ENCOUNTER — TELEPHONE (OUTPATIENT)
Dept: PERINATAL CARE | Facility: OTHER | Age: 40
End: 2020-03-18

## 2020-03-18 NOTE — TELEPHONE ENCOUNTER
[facility] Telephone Note:    Spoke with pt and confirmed appointment with [facility]  Pt advised of new visitor policy allowing NO support persons for appointment  PT also screened for COVID19      Cough: NO  Fever: NO  Shortness of breath:NO   Known exposures to COVID-19, or international travel: NO

## 2020-03-19 ENCOUNTER — ULTRASOUND (OUTPATIENT)
Dept: PERINATAL CARE | Facility: OTHER | Age: 40
End: 2020-03-19
Payer: COMMERCIAL

## 2020-03-19 ENCOUNTER — ROUTINE PRENATAL (OUTPATIENT)
Dept: OBGYN CLINIC | Facility: CLINIC | Age: 40
End: 2020-03-19

## 2020-03-19 VITALS — BODY MASS INDEX: 27.83 KG/M2 | SYSTOLIC BLOOD PRESSURE: 120 MMHG | WEIGHT: 183 LBS | DIASTOLIC BLOOD PRESSURE: 80 MMHG

## 2020-03-19 VITALS
DIASTOLIC BLOOD PRESSURE: 70 MMHG | SYSTOLIC BLOOD PRESSURE: 124 MMHG | BODY MASS INDEX: 27.83 KG/M2 | HEART RATE: 83 BPM | HEIGHT: 68 IN

## 2020-03-19 DIAGNOSIS — O09.523 MULTIGRAVIDA OF ADVANCED MATERNAL AGE IN THIRD TRIMESTER: ICD-10-CM

## 2020-03-19 DIAGNOSIS — O43.193 PLACENTAL CYST AFFECTING PREGNANCY IN THIRD TRIMESTER: ICD-10-CM

## 2020-03-19 DIAGNOSIS — Z3A.31 31 WEEKS GESTATION OF PREGNANCY: Primary | ICD-10-CM

## 2020-03-19 DIAGNOSIS — Z3A.31 31 WEEKS GESTATION OF PREGNANCY: ICD-10-CM

## 2020-03-19 DIAGNOSIS — O09.523 MULTIGRAVIDA OF ADVANCED MATERNAL AGE IN THIRD TRIMESTER: Primary | ICD-10-CM

## 2020-03-19 DIAGNOSIS — O34.219 HISTORY OF CESAREAN SECTION COMPLICATING PREGNANCY: ICD-10-CM

## 2020-03-19 PROCEDURE — PNV: Performed by: OBSTETRICS & GYNECOLOGY

## 2020-03-19 PROCEDURE — 76815 OB US LIMITED FETUS(S): CPT | Performed by: OBSTETRICS & GYNECOLOGY

## 2020-03-19 PROCEDURE — 59025 FETAL NON-STRESS TEST: CPT | Performed by: OBSTETRICS & GYNECOLOGY

## 2020-03-19 NOTE — LETTER
Amira Reich  MRN: [id number] MRN: [id number] MRN: [id number]  : 1980 : 1980 : 1980  VICKY/GA: VICKY/GA: VICKY/GA:  Date:  Date:  Date:     Ryland Kelly  MRN: [id number] MRN: [id number] MRN: [id number]  : 1980 : 1980 : 1980  VICKY/GA: VICKY/GA: VICKY/GA:  Date:  Date:  Date:     Ryland Kelly  MRN: [id number] MRN: [id number] MRN: [id number]  : [birth year] : 1980  VICKY/GA: VICKY/GA: VICKY/GA:  Date:  Date:  Date:     Ryland Kelly  MRN: [id number] MRN: [id number] MRN: [id number]  : [birth year] : 1980  VICKY/GA: VICKY/GA: VICKY/GA:  Date:  Date:  Date:     Ryland Kelly  MRN: [id number] MRN: [id number] MRN: [id number]  : [birth year] : 1980  VICKY/GA: VICKY/GA: VICKY/GA:  Date:  Date:  Date:     Ryland Kelly  MRN: [id number] MRN: [id number] MRN: [id number]  : [birth year] : 1980  VICKY/GA: VICKY/GA: VICKY/GA:  Date:  Date:  Date:     Rlyand Kelly  MRN: [id number] MRN: [id number] MRN: [id number]  : [birth year] : 1980  VICKY/GA: VICKY/GA: VICKY/GA:  Date:  Date:  Date:

## 2020-03-19 NOTE — PROGRESS NOTES
Patient was recently seen in the labor room for severe abdominal cramping     Incidental finding on ultrasound showed a placental cyst 6 7 x  6 8 x 4 4 cm  This has been stable on serial ultrasounds at   Center  Patient presently getting biweekly NSTs  No complaints today no leaking no bleeding no cramping  Impression:  Stable large placental cyst     Plan:  Continue biweekly NSTs  Return to the office in 1 week  Information on corona virus given

## 2020-03-19 NOTE — PROGRESS NOTES
VICKY: 5/19/2020  No bleeding  The patient had cramping a few weeks ago  No nausea, vomiting, headache or dizziness

## 2020-03-19 NOTE — PROGRESS NOTES
[address] [place] [address]: Ms Gudelia Aiken was seen today at 31w2d for NST (found under the pregnancy episode) which I reviewed the RN assessment and agree, and ROSIO (see ultrasound report under OB procedures tab)  See ultrasound report under "OB Procedures" tab    Please don't hesitate to contact our office with any concerns or questions   -Drew Kessler MD

## 2020-03-19 NOTE — PATIENT INSTRUCTIONS

## 2020-03-20 ENCOUNTER — TELEPHONE (OUTPATIENT)
Dept: PERINATAL CARE | Facility: OTHER | Age: 40
End: 2020-03-20

## 2020-03-20 NOTE — TELEPHONE ENCOUNTER
Spoke with patient and performed appointment pre-screening for COVID-19  Patient advised of updated no visitor policy

## 2020-03-23 ENCOUNTER — ROUTINE PRENATAL (OUTPATIENT)
Dept: PERINATAL CARE | Facility: CLINIC | Age: 40
End: 2020-03-23
Payer: COMMERCIAL

## 2020-03-23 VITALS
BODY MASS INDEX: 27.83 KG/M2 | SYSTOLIC BLOOD PRESSURE: 132 MMHG | HEART RATE: 110 BPM | DIASTOLIC BLOOD PRESSURE: 60 MMHG | HEIGHT: 68 IN

## 2020-03-23 DIAGNOSIS — Z3A.31 31 WEEKS GESTATION OF PREGNANCY: ICD-10-CM

## 2020-03-23 DIAGNOSIS — O43.193 PLACENTAL CYST AFFECTING PREGNANCY IN THIRD TRIMESTER: Primary | ICD-10-CM

## 2020-03-23 PROCEDURE — 99212 OFFICE O/P EST SF 10 MIN: CPT | Performed by: OBSTETRICS & GYNECOLOGY

## 2020-03-23 PROCEDURE — 59025 FETAL NON-STRESS TEST: CPT | Performed by: OBSTETRICS & GYNECOLOGY

## 2020-03-23 NOTE — PATIENT INSTRUCTIONS

## 2020-03-23 NOTE — PROGRESS NOTES
The patient had a nonstress test in the office  I have reviewed the nonstress test and agree with the documentation  Rafa Cea many questions regarding current plan since due to COVID19 changes,  surveillance has been recommended to be decreased as much as possible  I discussed with her that in general for her patients who undergo fetal testing, it is reasonable to decrease testing to once a week however given Amira's specific and rare higher risk condition, testing should likely continue twice a week  She is scheduled for a growth ultrasound with me on Thursday and we will further evaluate the frequency of testing based upon multiple conditions  She also had questions regarding work  She was previously recommended to decrease her shift to less than 24 hours secondary to  contractions  She would prefer once a week work with a 24 hour shift and has not had any significant contractions since her prior evaluation  I discussed with her that I think it is reasonable given her gestational age and since she is a very educated patient who will make the appropriate adjustments based upon her feelings  I support her in working a single 24 hour shift in a week based upon her underlying symptoms  If symptoms of  labor continue with these prolonged shifts, she should discontinue prolonged shifts as appropriate  Will continue to be available to this patient with any further questions or concerns  Thank you very much for allowing us to participate in the care of this very nice patient  Should you have any questions, please do not hesitate to contact our office  Please note, in addition to the time spent discussing the results of the ultrasound, I spent approximately 10 minutes of face-to-face time with the patient, greater than 50% of which was spent in counseling and the coordination of care for this patient

## 2020-03-25 ENCOUNTER — TELEPHONE (OUTPATIENT)
Dept: PERINATAL CARE | Facility: CLINIC | Age: 40
End: 2020-03-25

## 2020-03-25 NOTE — TELEPHONE ENCOUNTER
[facility] Telephone Note:    Spoke with pt and confirmed appointment with [facility]  Pt advised of new visitor policy allowing NO support persons for appointment  Patient also advised of phone check in process  PT also screened for COVID19      Cough: no  Fever: no  Shortness of breath:no  Known exposures to COVID-19, or international travel: no

## 2020-03-25 NOTE — TELEPHONE ENCOUNTER
[facility] Telephone Note:    -------------------------------------------------------------    Attempted to reach patient by phone and voicemail to confirm appointment for her ultrasound  At this time we are seeing regularly scheduled appointments  Patient was asked that if they were not feeling well, have cough, fever or Shortness of Breath to call and reschedule their appointment once all symptoms have resolved  If you have any of these symptoms please contact your primary care physician or [phone]  Pt was notified that there are NO visitors or support people to be present for their appointment and notified of the phone check in process

## 2020-03-26 ENCOUNTER — ULTRASOUND (OUTPATIENT)
Dept: PERINATAL CARE | Facility: OTHER | Age: 40
End: 2020-03-26
Payer: COMMERCIAL

## 2020-03-26 ENCOUNTER — APPOINTMENT (OUTPATIENT)
Dept: PERINATAL CARE | Facility: OTHER | Age: 40
End: 2020-03-26
Payer: COMMERCIAL

## 2020-03-26 VITALS
SYSTOLIC BLOOD PRESSURE: 110 MMHG | BODY MASS INDEX: 27.61 KG/M2 | DIASTOLIC BLOOD PRESSURE: 68 MMHG | HEIGHT: 68 IN | WEIGHT: 182.2 LBS | HEART RATE: 88 BPM

## 2020-03-26 DIAGNOSIS — O43.193 PLACENTAL CYST AFFECTING PREGNANCY IN THIRD TRIMESTER: Primary | ICD-10-CM

## 2020-03-26 DIAGNOSIS — Z3A.32 32 WEEKS GESTATION OF PREGNANCY: ICD-10-CM

## 2020-03-26 PROBLEM — O47.03 PRETERM UTERINE CONTRACTIONS IN THIRD TRIMESTER, ANTEPARTUM: Status: RESOLVED | Noted: 2020-03-11 | Resolved: 2020-03-26

## 2020-03-26 PROBLEM — Z3A.31 31 WEEKS GESTATION OF PREGNANCY: Status: RESOLVED | Noted: 2019-11-18 | Resolved: 2020-03-26

## 2020-03-26 PROCEDURE — 99213 OFFICE O/P EST LOW 20 MIN: CPT | Performed by: OBSTETRICS & GYNECOLOGY

## 2020-03-26 PROCEDURE — 76820 UMBILICAL ARTERY ECHO: CPT | Performed by: OBSTETRICS & GYNECOLOGY

## 2020-03-26 PROCEDURE — 59025 FETAL NON-STRESS TEST: CPT | Performed by: OBSTETRICS & GYNECOLOGY

## 2020-03-26 PROCEDURE — 76816 OB US FOLLOW-UP PER FETUS: CPT | Performed by: OBSTETRICS & GYNECOLOGY

## 2020-03-26 PROCEDURE — 76821 MIDDLE CEREBRAL ARTERY ECHO: CPT | Performed by: OBSTETRICS & GYNECOLOGY

## 2020-03-26 RX ORDER — CETIRIZINE HYDROCHLORIDE 10 MG/1
10 TABLET ORAL DAILY
COMMUNITY
End: 2021-01-06

## 2020-04-01 ENCOUNTER — TELEPHONE (OUTPATIENT)
Dept: PERINATAL CARE | Facility: OTHER | Age: 40
End: 2020-04-01

## 2020-04-02 ENCOUNTER — ULTRASOUND (OUTPATIENT)
Dept: PERINATAL CARE | Facility: CLINIC | Age: 40
End: 2020-04-02
Payer: COMMERCIAL

## 2020-04-02 ENCOUNTER — ROUTINE PRENATAL (OUTPATIENT)
Dept: OBGYN CLINIC | Facility: CLINIC | Age: 40
End: 2020-04-02
Payer: COMMERCIAL

## 2020-04-02 VITALS
HEIGHT: 68 IN | WEIGHT: 185.6 LBS | DIASTOLIC BLOOD PRESSURE: 72 MMHG | BODY MASS INDEX: 28.13 KG/M2 | HEART RATE: 103 BPM | SYSTOLIC BLOOD PRESSURE: 140 MMHG

## 2020-04-02 VITALS — WEIGHT: 185 LBS | SYSTOLIC BLOOD PRESSURE: 110 MMHG | BODY MASS INDEX: 28.13 KG/M2 | DIASTOLIC BLOOD PRESSURE: 70 MMHG

## 2020-04-02 DIAGNOSIS — O43.193 PLACENTAL CYST AFFECTING PREGNANCY IN THIRD TRIMESTER: Primary | ICD-10-CM

## 2020-04-02 DIAGNOSIS — Z3A.33 33 WEEKS GESTATION OF PREGNANCY: ICD-10-CM

## 2020-04-02 DIAGNOSIS — O43.193 PLACENTAL CYST AFFECTING PREGNANCY IN THIRD TRIMESTER: ICD-10-CM

## 2020-04-02 DIAGNOSIS — O09.523 MULTIGRAVIDA OF ADVANCED MATERNAL AGE IN THIRD TRIMESTER: ICD-10-CM

## 2020-04-02 DIAGNOSIS — Z34.83 PRENATAL CARE, SUBSEQUENT PREGNANCY IN THIRD TRIMESTER: Primary | ICD-10-CM

## 2020-04-02 DIAGNOSIS — O34.219 HISTORY OF CESAREAN SECTION COMPLICATING PREGNANCY: ICD-10-CM

## 2020-04-02 DIAGNOSIS — R03.0 ELEVATED BLOOD PRESSURE READING: ICD-10-CM

## 2020-04-02 PROCEDURE — 76821 MIDDLE CEREBRAL ARTERY ECHO: CPT | Performed by: OBSTETRICS & GYNECOLOGY

## 2020-04-02 PROCEDURE — 76820 UMBILICAL ARTERY ECHO: CPT | Performed by: OBSTETRICS & GYNECOLOGY

## 2020-04-02 PROCEDURE — 76818 FETAL BIOPHYS PROFILE W/NST: CPT | Performed by: OBSTETRICS & GYNECOLOGY

## 2020-04-02 PROCEDURE — 76816 OB US FOLLOW-UP PER FETUS: CPT | Performed by: OBSTETRICS & GYNECOLOGY

## 2020-04-02 PROCEDURE — PNV: Performed by: PHYSICIAN ASSISTANT

## 2020-04-02 PROCEDURE — 99910: CPT | Performed by: PHYSICIAN ASSISTANT

## 2020-04-07 ENCOUNTER — TELEPHONE (OUTPATIENT)
Dept: OBGYN CLINIC | Facility: CLINIC | Age: 40
End: 2020-04-07

## 2020-04-08 ENCOUNTER — TELEPHONE (OUTPATIENT)
Dept: PERINATAL CARE | Facility: CLINIC | Age: 40
End: 2020-04-08

## 2020-04-09 ENCOUNTER — ULTRASOUND (OUTPATIENT)
Dept: PERINATAL CARE | Facility: CLINIC | Age: 40
End: 2020-04-09
Payer: COMMERCIAL

## 2020-04-09 VITALS
WEIGHT: 186 LBS | BODY MASS INDEX: 28.19 KG/M2 | HEIGHT: 68 IN | SYSTOLIC BLOOD PRESSURE: 124 MMHG | HEART RATE: 90 BPM | DIASTOLIC BLOOD PRESSURE: 68 MMHG

## 2020-04-09 DIAGNOSIS — O43.193 PLACENTAL CYST AFFECTING PREGNANCY IN THIRD TRIMESTER: Primary | ICD-10-CM

## 2020-04-09 DIAGNOSIS — Z3A.34 34 WEEKS GESTATION OF PREGNANCY: ICD-10-CM

## 2020-04-09 DIAGNOSIS — O09.523 MULTIGRAVIDA OF ADVANCED MATERNAL AGE IN THIRD TRIMESTER: ICD-10-CM

## 2020-04-09 DIAGNOSIS — O16.3 ELEVATED BLOOD PRESSURE AFFECTING PREGNANCY IN THIRD TRIMESTER, ANTEPARTUM: ICD-10-CM

## 2020-04-09 PROCEDURE — 76821 MIDDLE CEREBRAL ARTERY ECHO: CPT | Performed by: OBSTETRICS & GYNECOLOGY

## 2020-04-09 PROCEDURE — 76818 FETAL BIOPHYS PROFILE W/NST: CPT | Performed by: OBSTETRICS & GYNECOLOGY

## 2020-04-09 PROCEDURE — 76820 UMBILICAL ARTERY ECHO: CPT | Performed by: OBSTETRICS & GYNECOLOGY

## 2020-04-09 PROCEDURE — 76815 OB US LIMITED FETUS(S): CPT | Performed by: OBSTETRICS & GYNECOLOGY

## 2020-04-10 ENCOUNTER — APPOINTMENT (OUTPATIENT)
Dept: LAB | Facility: HOSPITAL | Age: 40
End: 2020-04-10
Attending: OBSTETRICS & GYNECOLOGY
Payer: COMMERCIAL

## 2020-04-10 LAB
ALBUMIN SERPL BCP-MCNC: 2.7 G/DL (ref 3.5–5)
ALP SERPL-CCNC: 119 U/L (ref 46–116)
ALT SERPL W P-5'-P-CCNC: 17 U/L (ref 12–78)
ANION GAP SERPL CALCULATED.3IONS-SCNC: 5 MMOL/L (ref 4–13)
AST SERPL W P-5'-P-CCNC: 16 U/L (ref 5–45)
BILIRUB SERPL-MCNC: 0.3 MG/DL (ref 0.2–1)
BUN SERPL-MCNC: 8 MG/DL (ref 5–25)
CALCIUM SERPL-MCNC: 8.5 MG/DL (ref 8.3–10.1)
CHLORIDE SERPL-SCNC: 107 MMOL/L (ref 100–108)
CO2 SERPL-SCNC: 25 MMOL/L (ref 21–32)
CREAT SERPL-MCNC: 0.58 MG/DL (ref 0.6–1.3)
CREAT UR-MCNC: 57.1 MG/DL
ERYTHROCYTE [DISTWIDTH] IN BLOOD BY AUTOMATED COUNT: 12.9 % (ref 11.6–15.1)
GFR SERPL CREATININE-BSD FRML MDRD: 116 ML/MIN/1.73SQ M
GLUCOSE SERPL-MCNC: 123 MG/DL (ref 65–140)
HCT VFR BLD AUTO: 35.1 % (ref 34.8–46.1)
HGB BLD-MCNC: 12 G/DL (ref 11.5–15.4)
LDH SERPL-CCNC: 203 U/L (ref 81–234)
MCH RBC QN AUTO: 29.1 PG (ref 26.8–34.3)
MCHC RBC AUTO-ENTMCNC: 34.2 G/DL (ref 31.4–37.4)
MCV RBC AUTO: 85 FL (ref 82–98)
PLATELET # BLD AUTO: 243 THOUSANDS/UL (ref 149–390)
PMV BLD AUTO: 10.3 FL (ref 8.9–12.7)
POTASSIUM SERPL-SCNC: 4 MMOL/L (ref 3.5–5.3)
PROT SERPL-MCNC: 6.8 G/DL (ref 6.4–8.2)
PROT UR-MCNC: 6 MG/DL
PROT/CREAT UR: 0.11 MG/G{CREAT} (ref 0–0.1)
RBC # BLD AUTO: 4.13 MILLION/UL (ref 3.81–5.12)
SODIUM SERPL-SCNC: 137 MMOL/L (ref 136–145)
URATE SERPL-MCNC: 4.8 MG/DL (ref 2–6.8)
WBC # BLD AUTO: 7.31 THOUSAND/UL (ref 4.31–10.16)

## 2020-04-10 PROCEDURE — 84550 ASSAY OF BLOOD/URIC ACID: CPT | Performed by: OBSTETRICS & GYNECOLOGY

## 2020-04-10 PROCEDURE — 36415 COLL VENOUS BLD VENIPUNCTURE: CPT | Performed by: OBSTETRICS & GYNECOLOGY

## 2020-04-10 PROCEDURE — 84156 ASSAY OF PROTEIN URINE: CPT | Performed by: OBSTETRICS & GYNECOLOGY

## 2020-04-10 PROCEDURE — 83615 LACTATE (LD) (LDH) ENZYME: CPT | Performed by: OBSTETRICS & GYNECOLOGY

## 2020-04-10 PROCEDURE — 85027 COMPLETE CBC AUTOMATED: CPT | Performed by: OBSTETRICS & GYNECOLOGY

## 2020-04-10 PROCEDURE — 82570 ASSAY OF URINE CREATININE: CPT | Performed by: OBSTETRICS & GYNECOLOGY

## 2020-04-10 PROCEDURE — 80053 COMPREHEN METABOLIC PANEL: CPT | Performed by: OBSTETRICS & GYNECOLOGY

## 2020-04-15 ENCOUNTER — TELEPHONE (OUTPATIENT)
Dept: PERINATAL CARE | Facility: CLINIC | Age: 40
End: 2020-04-15

## 2020-04-16 ENCOUNTER — ULTRASOUND (OUTPATIENT)
Dept: PERINATAL CARE | Facility: CLINIC | Age: 40
End: 2020-04-16
Payer: COMMERCIAL

## 2020-04-16 VITALS
HEART RATE: 89 BPM | WEIGHT: 188 LBS | BODY MASS INDEX: 28.49 KG/M2 | HEIGHT: 68 IN | SYSTOLIC BLOOD PRESSURE: 120 MMHG | DIASTOLIC BLOOD PRESSURE: 57 MMHG

## 2020-04-16 DIAGNOSIS — Z36.89 ENCOUNTER FOR ULTRASOUND TO ASSESS FETAL GROWTH: ICD-10-CM

## 2020-04-16 DIAGNOSIS — O09.523 MULTIGRAVIDA OF ADVANCED MATERNAL AGE IN THIRD TRIMESTER: ICD-10-CM

## 2020-04-16 DIAGNOSIS — Z3A.35 35 WEEKS GESTATION OF PREGNANCY: Primary | ICD-10-CM

## 2020-04-16 DIAGNOSIS — O43.193 PLACENTAL CYST AFFECTING PREGNANCY IN THIRD TRIMESTER: ICD-10-CM

## 2020-04-16 PROBLEM — R03.0 ELEVATED BLOOD PRESSURE READING: Status: ACTIVE | Noted: 2020-04-16

## 2020-04-16 PROBLEM — R03.0 ELEVATED BLOOD PRESSURE READING: Status: RESOLVED | Noted: 2020-03-09 | Resolved: 2020-04-16

## 2020-04-16 PROCEDURE — 76821 MIDDLE CEREBRAL ARTERY ECHO: CPT | Performed by: OBSTETRICS & GYNECOLOGY

## 2020-04-16 PROCEDURE — 76816 OB US FOLLOW-UP PER FETUS: CPT | Performed by: OBSTETRICS & GYNECOLOGY

## 2020-04-16 PROCEDURE — 59025 FETAL NON-STRESS TEST: CPT | Performed by: OBSTETRICS & GYNECOLOGY

## 2020-04-16 PROCEDURE — 99212 OFFICE O/P EST SF 10 MIN: CPT | Performed by: OBSTETRICS & GYNECOLOGY

## 2020-04-21 ENCOUNTER — TELEPHONE (OUTPATIENT)
Dept: OBGYN CLINIC | Facility: CLINIC | Age: 40
End: 2020-04-21

## 2020-04-22 ENCOUNTER — TELEPHONE (OUTPATIENT)
Dept: PERINATAL CARE | Facility: OTHER | Age: 40
End: 2020-04-22

## 2020-04-22 ENCOUNTER — ROUTINE PRENATAL (OUTPATIENT)
Dept: OBGYN CLINIC | Facility: CLINIC | Age: 40
End: 2020-04-22

## 2020-04-22 VITALS — DIASTOLIC BLOOD PRESSURE: 80 MMHG | WEIGHT: 194 LBS | SYSTOLIC BLOOD PRESSURE: 124 MMHG | BODY MASS INDEX: 29.5 KG/M2

## 2020-04-22 DIAGNOSIS — Z3A.36 36 WEEKS GESTATION OF PREGNANCY: ICD-10-CM

## 2020-04-22 DIAGNOSIS — Z36.85 ANTENATAL SCREENING FOR STREPTOCOCCUS B: Primary | ICD-10-CM

## 2020-04-22 DIAGNOSIS — O43.193 PLACENTAL CYST AFFECTING PREGNANCY IN THIRD TRIMESTER: ICD-10-CM

## 2020-04-22 DIAGNOSIS — Z34.90 PREGNANCY, UNSPECIFIED GESTATIONAL AGE: Primary | ICD-10-CM

## 2020-04-22 DIAGNOSIS — O34.219 HISTORY OF CESAREAN SECTION COMPLICATING PREGNANCY: ICD-10-CM

## 2020-04-22 DIAGNOSIS — O09.523 MULTIGRAVIDA OF ADVANCED MATERNAL AGE IN THIRD TRIMESTER: ICD-10-CM

## 2020-04-22 PROCEDURE — 87653 STREP B DNA AMP PROBE: CPT | Performed by: OBSTETRICS & GYNECOLOGY

## 2020-04-22 PROCEDURE — PNV: Performed by: OBSTETRICS & GYNECOLOGY

## 2020-04-23 ENCOUNTER — ULTRASOUND (OUTPATIENT)
Dept: PERINATAL CARE | Facility: OTHER | Age: 40
End: 2020-04-23
Payer: COMMERCIAL

## 2020-04-23 VITALS
WEIGHT: 189.8 LBS | BODY MASS INDEX: 28.76 KG/M2 | HEIGHT: 68 IN | HEART RATE: 102 BPM | TEMPERATURE: 96.9 F | SYSTOLIC BLOOD PRESSURE: 132 MMHG | DIASTOLIC BLOOD PRESSURE: 80 MMHG

## 2020-04-23 DIAGNOSIS — Z3A.36 36 WEEKS GESTATION OF PREGNANCY: ICD-10-CM

## 2020-04-23 DIAGNOSIS — O43.103 PLACENTAL ABNORMALITY, THIRD TRIMESTER: ICD-10-CM

## 2020-04-23 DIAGNOSIS — O43.193 PLACENTAL CYST AFFECTING PREGNANCY IN THIRD TRIMESTER: Primary | ICD-10-CM

## 2020-04-23 DIAGNOSIS — O09.523 MULTIGRAVIDA OF ADVANCED MATERNAL AGE IN THIRD TRIMESTER: ICD-10-CM

## 2020-04-23 PROCEDURE — 76821 MIDDLE CEREBRAL ARTERY ECHO: CPT | Performed by: OBSTETRICS & GYNECOLOGY

## 2020-04-23 PROCEDURE — 76815 OB US LIMITED FETUS(S): CPT | Performed by: OBSTETRICS & GYNECOLOGY

## 2020-04-23 PROCEDURE — 59025 FETAL NON-STRESS TEST: CPT | Performed by: OBSTETRICS & GYNECOLOGY

## 2020-04-24 ENCOUNTER — TELEPHONE (OUTPATIENT)
Dept: PERINATAL CARE | Facility: CLINIC | Age: 40
End: 2020-04-24

## 2020-04-25 LAB — GP B STREP DNA SPEC QL NAA+PROBE: NORMAL

## 2020-04-27 ENCOUNTER — ROUTINE PRENATAL (OUTPATIENT)
Dept: PERINATAL CARE | Facility: OTHER | Age: 40
End: 2020-04-27
Payer: COMMERCIAL

## 2020-04-27 VITALS
WEIGHT: 190.4 LBS | HEART RATE: 102 BPM | TEMPERATURE: 97 F | BODY MASS INDEX: 28.85 KG/M2 | SYSTOLIC BLOOD PRESSURE: 124 MMHG | HEIGHT: 68 IN | DIASTOLIC BLOOD PRESSURE: 78 MMHG

## 2020-04-27 DIAGNOSIS — O34.219 HISTORY OF CESAREAN SECTION COMPLICATING PREGNANCY: ICD-10-CM

## 2020-04-27 DIAGNOSIS — Z3A.36 36 WEEKS GESTATION OF PREGNANCY: Primary | ICD-10-CM

## 2020-04-27 DIAGNOSIS — O43.193 PLACENTAL CYST AFFECTING PREGNANCY IN THIRD TRIMESTER: ICD-10-CM

## 2020-04-27 DIAGNOSIS — O09.523 MULTIGRAVIDA OF ADVANCED MATERNAL AGE IN THIRD TRIMESTER: ICD-10-CM

## 2020-04-27 PROCEDURE — 59025 FETAL NON-STRESS TEST: CPT | Performed by: OBSTETRICS & GYNECOLOGY

## 2020-04-29 ENCOUNTER — TELEPHONE (OUTPATIENT)
Dept: PERINATAL CARE | Facility: CLINIC | Age: 40
End: 2020-04-29

## 2020-04-30 ENCOUNTER — APPOINTMENT (OUTPATIENT)
Dept: PERINATAL CARE | Facility: CLINIC | Age: 40
End: 2020-04-30
Payer: COMMERCIAL

## 2020-04-30 ENCOUNTER — ROUTINE PRENATAL (OUTPATIENT)
Dept: OBGYN CLINIC | Facility: CLINIC | Age: 40
End: 2020-04-30

## 2020-04-30 ENCOUNTER — ULTRASOUND (OUTPATIENT)
Dept: PERINATAL CARE | Facility: CLINIC | Age: 40
End: 2020-04-30
Payer: COMMERCIAL

## 2020-04-30 VITALS
HEART RATE: 89 BPM | SYSTOLIC BLOOD PRESSURE: 126 MMHG | HEIGHT: 68 IN | WEIGHT: 192.8 LBS | DIASTOLIC BLOOD PRESSURE: 67 MMHG | BODY MASS INDEX: 29.22 KG/M2 | TEMPERATURE: 97.2 F

## 2020-04-30 VITALS — WEIGHT: 194 LBS | SYSTOLIC BLOOD PRESSURE: 110 MMHG | BODY MASS INDEX: 29.5 KG/M2 | DIASTOLIC BLOOD PRESSURE: 70 MMHG

## 2020-04-30 DIAGNOSIS — Z3A.37 37 WEEKS GESTATION OF PREGNANCY: ICD-10-CM

## 2020-04-30 DIAGNOSIS — O09.523 MULTIGRAVIDA OF ADVANCED MATERNAL AGE IN THIRD TRIMESTER: ICD-10-CM

## 2020-04-30 DIAGNOSIS — O34.219 HISTORY OF CESAREAN SECTION COMPLICATING PREGNANCY: ICD-10-CM

## 2020-04-30 DIAGNOSIS — Z34.83 PRENATAL CARE, SUBSEQUENT PREGNANCY IN THIRD TRIMESTER: Primary | ICD-10-CM

## 2020-04-30 DIAGNOSIS — O43.193 PLACENTAL CYST AFFECTING PREGNANCY IN THIRD TRIMESTER: Primary | ICD-10-CM

## 2020-04-30 DIAGNOSIS — O43.193 PLACENTAL CYST AFFECTING PREGNANCY IN THIRD TRIMESTER: ICD-10-CM

## 2020-04-30 DIAGNOSIS — R03.0 ELEVATED BLOOD PRESSURE READING: ICD-10-CM

## 2020-04-30 PROCEDURE — 99212 OFFICE O/P EST SF 10 MIN: CPT | Performed by: OBSTETRICS & GYNECOLOGY

## 2020-04-30 PROCEDURE — 59025 FETAL NON-STRESS TEST: CPT | Performed by: OBSTETRICS & GYNECOLOGY

## 2020-04-30 PROCEDURE — 76821 MIDDLE CEREBRAL ARTERY ECHO: CPT | Performed by: OBSTETRICS & GYNECOLOGY

## 2020-04-30 PROCEDURE — PNV: Performed by: PHYSICIAN ASSISTANT

## 2020-04-30 PROCEDURE — 76816 OB US FOLLOW-UP PER FETUS: CPT | Performed by: OBSTETRICS & GYNECOLOGY

## 2020-05-01 ENCOUNTER — TELEPHONE (OUTPATIENT)
Dept: PERINATAL CARE | Facility: CLINIC | Age: 40
End: 2020-05-01

## 2020-05-04 ENCOUNTER — ROUTINE PRENATAL (OUTPATIENT)
Dept: PERINATAL CARE | Facility: OTHER | Age: 40
End: 2020-05-04
Payer: COMMERCIAL

## 2020-05-04 VITALS
SYSTOLIC BLOOD PRESSURE: 118 MMHG | TEMPERATURE: 97.2 F | HEIGHT: 68 IN | WEIGHT: 194.2 LBS | HEART RATE: 78 BPM | BODY MASS INDEX: 29.43 KG/M2 | DIASTOLIC BLOOD PRESSURE: 74 MMHG

## 2020-05-04 DIAGNOSIS — Z3A.37 37 WEEKS GESTATION OF PREGNANCY: Primary | ICD-10-CM

## 2020-05-04 DIAGNOSIS — O43.193 PLACENTAL CYST AFFECTING PREGNANCY IN THIRD TRIMESTER: ICD-10-CM

## 2020-05-04 PROCEDURE — 59025 FETAL NON-STRESS TEST: CPT | Performed by: OBSTETRICS & GYNECOLOGY

## 2020-05-06 ENCOUNTER — TELEPHONE (OUTPATIENT)
Dept: PERINATAL CARE | Facility: OTHER | Age: 40
End: 2020-05-06

## 2020-05-06 ENCOUNTER — ROUTINE PRENATAL (OUTPATIENT)
Dept: OBGYN CLINIC | Facility: CLINIC | Age: 40
End: 2020-05-06

## 2020-05-06 VITALS — WEIGHT: 196.5 LBS | SYSTOLIC BLOOD PRESSURE: 120 MMHG | BODY MASS INDEX: 29.88 KG/M2 | DIASTOLIC BLOOD PRESSURE: 80 MMHG

## 2020-05-06 DIAGNOSIS — O34.219 HISTORY OF CESAREAN SECTION COMPLICATING PREGNANCY: ICD-10-CM

## 2020-05-06 DIAGNOSIS — O43.193 PLACENTAL CYST AFFECTING PREGNANCY IN THIRD TRIMESTER: ICD-10-CM

## 2020-05-06 DIAGNOSIS — Z3A.38 38 WEEKS GESTATION OF PREGNANCY: ICD-10-CM

## 2020-05-06 DIAGNOSIS — O09.523 MULTIGRAVIDA OF ADVANCED MATERNAL AGE IN THIRD TRIMESTER: Primary | ICD-10-CM

## 2020-05-06 PROCEDURE — PNV: Performed by: OBSTETRICS & GYNECOLOGY

## 2020-05-07 ENCOUNTER — ULTRASOUND (OUTPATIENT)
Dept: PERINATAL CARE | Facility: OTHER | Age: 40
End: 2020-05-07
Payer: COMMERCIAL

## 2020-05-07 VITALS
SYSTOLIC BLOOD PRESSURE: 131 MMHG | HEIGHT: 68 IN | HEART RATE: 102 BPM | DIASTOLIC BLOOD PRESSURE: 83 MMHG | TEMPERATURE: 98 F | BODY MASS INDEX: 29.43 KG/M2 | WEIGHT: 194.2 LBS

## 2020-05-07 DIAGNOSIS — O43.193 PLACENTAL CYST AFFECTING PREGNANCY IN THIRD TRIMESTER: Primary | ICD-10-CM

## 2020-05-07 DIAGNOSIS — Z3A.38 38 WEEKS GESTATION OF PREGNANCY: ICD-10-CM

## 2020-05-07 PROCEDURE — 59025 FETAL NON-STRESS TEST: CPT | Performed by: OBSTETRICS & GYNECOLOGY

## 2020-05-07 PROCEDURE — 76815 OB US LIMITED FETUS(S): CPT | Performed by: OBSTETRICS & GYNECOLOGY

## 2020-05-11 ENCOUNTER — ANESTHESIA (OUTPATIENT)
Dept: ANESTHESIOLOGY | Facility: HOSPITAL | Age: 40
DRG: 833 | End: 2020-05-11
Payer: COMMERCIAL

## 2020-05-11 ENCOUNTER — HOSPITAL ENCOUNTER (INPATIENT)
Facility: HOSPITAL | Age: 40
LOS: 2 days | Discharge: HOME | DRG: 833 | End: 2020-05-13
Attending: OBSTETRICS & GYNECOLOGY | Admitting: OBSTETRICS & GYNECOLOGY
Payer: COMMERCIAL

## 2020-05-11 ENCOUNTER — ANESTHESIA EVENT (OUTPATIENT)
Dept: ANESTHESIOLOGY | Facility: HOSPITAL | Age: 40
DRG: 833 | End: 2020-05-11
Payer: COMMERCIAL

## 2020-05-11 DIAGNOSIS — Z3A.38 38 WEEKS GESTATION OF PREGNANCY: Primary | ICD-10-CM

## 2020-05-11 LAB
ABO GROUP BLD: NORMAL
BLD GP AB SCN SERPL QL: NEGATIVE
ERYTHROCYTE [DISTWIDTH] IN BLOOD BY AUTOMATED COUNT: 13.5 % (ref 11.6–15.1)
HCT VFR BLD AUTO: 35.4 % (ref 34.8–46.1)
HGB BLD-MCNC: 11.7 G/DL (ref 11.5–15.4)
MCH RBC QN AUTO: 27.8 PG (ref 26.8–34.3)
MCHC RBC AUTO-ENTMCNC: 33.1 G/DL (ref 31.4–37.4)
MCV RBC AUTO: 84 FL (ref 82–98)
PLATELET # BLD AUTO: 232 THOUSANDS/UL (ref 149–390)
PMV BLD AUTO: 10.6 FL (ref 8.9–12.7)
RBC # BLD AUTO: 4.21 MILLION/UL (ref 3.81–5.12)
RH BLD: POSITIVE
SPECIMEN EXPIRATION DATE: NORMAL
WBC # BLD AUTO: 8.42 THOUSAND/UL (ref 4.31–10.16)

## 2020-05-11 PROCEDURE — 86901 BLOOD TYPING SEROLOGIC RH(D): CPT | Performed by: OBSTETRICS & GYNECOLOGY

## 2020-05-11 PROCEDURE — 85027 COMPLETE CBC AUTOMATED: CPT | Performed by: OBSTETRICS & GYNECOLOGY

## 2020-05-11 PROCEDURE — NC001 PR NO CHARGE: Performed by: OBSTETRICS & GYNECOLOGY

## 2020-05-11 PROCEDURE — 86900 BLOOD TYPING SEROLOGIC ABO: CPT | Performed by: OBSTETRICS & GYNECOLOGY

## 2020-05-11 PROCEDURE — G0463 HOSPITAL OUTPT CLINIC VISIT: HCPCS

## 2020-05-11 PROCEDURE — 86592 SYPHILIS TEST NON-TREP QUAL: CPT | Performed by: OBSTETRICS & GYNECOLOGY

## 2020-05-11 PROCEDURE — 99213 OFFICE O/P EST LOW 20 MIN: CPT

## 2020-05-11 PROCEDURE — 86850 RBC ANTIBODY SCREEN: CPT | Performed by: OBSTETRICS & GYNECOLOGY

## 2020-05-11 RX ORDER — OXYTOCIN/RINGER'S LACTATE 30/500 ML
1-30 PLASTIC BAG, INJECTION (ML) INTRAVENOUS
Status: DISCONTINUED | OUTPATIENT
Start: 2020-05-11 | End: 2020-05-13 | Stop reason: HOSPADM

## 2020-05-11 RX ORDER — LIDOCAINE HYDROCHLORIDE AND EPINEPHRINE 15; 5 MG/ML; UG/ML
INJECTION, SOLUTION EPIDURAL
Status: COMPLETED | OUTPATIENT
Start: 2020-05-11 | End: 2020-05-11

## 2020-05-11 RX ORDER — SODIUM CHLORIDE, SODIUM LACTATE, POTASSIUM CHLORIDE, CALCIUM CHLORIDE 600; 310; 30; 20 MG/100ML; MG/100ML; MG/100ML; MG/100ML
125 INJECTION, SOLUTION INTRAVENOUS CONTINUOUS
Status: DISCONTINUED | OUTPATIENT
Start: 2020-05-11 | End: 2020-05-13 | Stop reason: HOSPADM

## 2020-05-11 RX ORDER — ONDANSETRON 2 MG/ML
4 INJECTION INTRAMUSCULAR; INTRAVENOUS EVERY 6 HOURS PRN
Status: DISCONTINUED | OUTPATIENT
Start: 2020-05-11 | End: 2020-05-13 | Stop reason: HOSPADM

## 2020-05-11 RX ORDER — OXYTOCIN/RINGER'S LACTATE 30/500 ML
PLASTIC BAG, INJECTION (ML) INTRAVENOUS
Status: COMPLETED
Start: 2020-05-11 | End: 2020-05-11

## 2020-05-11 RX ADMIN — SODIUM CHLORIDE, SODIUM LACTATE, POTASSIUM CHLORIDE, AND CALCIUM CHLORIDE 999 ML/HR: .6; .31; .03; .02 INJECTION, SOLUTION INTRAVENOUS at 19:00

## 2020-05-11 RX ADMIN — ONDANSETRON 4 MG: 2 INJECTION INTRAMUSCULAR; INTRAVENOUS at 21:47

## 2020-05-11 RX ADMIN — Medication 2 MILLI-UNITS/MIN: at 21:52

## 2020-05-11 RX ADMIN — ROPIVACAINE HYDROCHLORIDE: 2 INJECTION, SOLUTION EPIDURAL; INFILTRATION at 20:18

## 2020-05-11 RX ADMIN — SODIUM CHLORIDE, SODIUM LACTATE, POTASSIUM CHLORIDE, AND CALCIUM CHLORIDE 125 ML/HR: .6; .31; .03; .02 INJECTION, SOLUTION INTRAVENOUS at 19:43

## 2020-05-11 RX ADMIN — LIDOCAINE HYDROCHLORIDE AND EPINEPHRINE 5 ML: 15; 5 INJECTION, SOLUTION EPIDURAL at 20:03

## 2020-05-12 LAB
BASE EXCESS BLDCOA CALC-SCNC: -1.3 MMOL/L (ref 3–11)
BASE EXCESS BLDCOV CALC-SCNC: -2.7 MMOL/L (ref 1–9)
HCO3 BLDCOA-SCNC: 26.8 MMOL/L (ref 17.3–27.3)
HCO3 BLDCOV-SCNC: 25.3 MMOL/L (ref 12.2–28.6)
O2 CT VFR BLDCOA CALC: 3.6 ML/DL
OXYHGB MFR BLDCOA: 15.4 %
OXYHGB MFR BLDCOV: 16.1 %
PCO2 BLDCOA: 58.4 MM[HG] (ref 30–60)
PCO2 BLDCOV: 56.6 MM HG (ref 27–43)
PH BLDCOA: 7.28 [PH] (ref 7.23–7.43)
PH BLDCOV: 7.27 [PH] (ref 7.19–7.49)
PO2 BLDCOA: 12.1 MM HG (ref 5–25)
PO2 BLDCOV: 11.3 MM HG (ref 15–45)
RPR SER QL: NORMAL
SAO2 % BLDCOV: 3.8 ML/DL

## 2020-05-12 PROCEDURE — 3E033VJ INTRODUCTION OF OTHER HORMONE INTO PERIPHERAL VEIN, PERCUTANEOUS APPROACH: ICD-10-PCS | Performed by: OBSTETRICS & GYNECOLOGY

## 2020-05-12 PROCEDURE — 99024 POSTOP FOLLOW-UP VISIT: CPT | Performed by: OBSTETRICS & GYNECOLOGY

## 2020-05-12 PROCEDURE — 4A1HXCZ MONITORING OF PRODUCTS OF CONCEPTION, CARDIAC RATE, EXTERNAL APPROACH: ICD-10-PCS | Performed by: OBSTETRICS & GYNECOLOGY

## 2020-05-12 PROCEDURE — 88307 TISSUE EXAM BY PATHOLOGIST: CPT | Performed by: PATHOLOGY

## 2020-05-12 PROCEDURE — 82805 BLOOD GASES W/O2 SATURATION: CPT | Performed by: OBSTETRICS & GYNECOLOGY

## 2020-05-12 PROCEDURE — 10907ZC DRAINAGE OF AMNIOTIC FLUID, THERAPEUTIC FROM PRODUCTS OF CONCEPTION, VIA NATURAL OR ARTIFICIAL OPENING: ICD-10-PCS | Performed by: OBSTETRICS & GYNECOLOGY

## 2020-05-12 RX ORDER — OXYCODONE HYDROCHLORIDE AND ACETAMINOPHEN 5; 325 MG/1; MG/1
1 TABLET ORAL EVERY 4 HOURS PRN
Status: DISCONTINUED | OUTPATIENT
Start: 2020-05-12 | End: 2020-05-13 | Stop reason: HOSPADM

## 2020-05-12 RX ORDER — DIAPER,BRIEF,INFANT-TODD,DISP
1 EACH MISCELLANEOUS 3 TIMES DAILY
Status: DISCONTINUED | OUTPATIENT
Start: 2020-05-12 | End: 2020-05-12

## 2020-05-12 RX ORDER — DOCUSATE SODIUM 100 MG/1
100 CAPSULE, LIQUID FILLED ORAL 2 TIMES DAILY
Status: DISCONTINUED | OUTPATIENT
Start: 2020-05-12 | End: 2020-05-13 | Stop reason: HOSPADM

## 2020-05-12 RX ORDER — DIAPER,BRIEF,INFANT-TODD,DISP
1 EACH MISCELLANEOUS 3 TIMES DAILY PRN
Status: DISCONTINUED | OUTPATIENT
Start: 2020-05-12 | End: 2020-05-13 | Stop reason: HOSPADM

## 2020-05-12 RX ORDER — ACETAMINOPHEN 325 MG/1
650 TABLET ORAL EVERY 4 HOURS PRN
Status: DISCONTINUED | OUTPATIENT
Start: 2020-05-12 | End: 2020-05-13 | Stop reason: HOSPADM

## 2020-05-12 RX ORDER — OXYCODONE HYDROCHLORIDE AND ACETAMINOPHEN 5; 325 MG/1; MG/1
2 TABLET ORAL EVERY 4 HOURS PRN
Status: DISCONTINUED | OUTPATIENT
Start: 2020-05-12 | End: 2020-05-13 | Stop reason: HOSPADM

## 2020-05-12 RX ORDER — IBUPROFEN 600 MG/1
600 TABLET ORAL EVERY 6 HOURS PRN
Status: DISCONTINUED | OUTPATIENT
Start: 2020-05-12 | End: 2020-05-13 | Stop reason: HOSPADM

## 2020-05-12 RX ADMIN — WITCH HAZEL 1 PAD: 500 SOLUTION RECTAL; TOPICAL at 02:49

## 2020-05-12 RX ADMIN — DOCUSATE SODIUM 100 MG: 100 CAPSULE, LIQUID FILLED ORAL at 08:25

## 2020-05-12 RX ADMIN — Medication: at 02:49

## 2020-05-12 RX ADMIN — IBUPROFEN 600 MG: 600 TABLET ORAL at 21:19

## 2020-05-12 RX ADMIN — HYDROCORTISONE 1 APPLICATION: 1 CREAM TOPICAL at 21:00

## 2020-05-12 RX ADMIN — IBUPROFEN 600 MG: 600 TABLET ORAL at 08:25

## 2020-05-12 RX ADMIN — DOCUSATE SODIUM 100 MG: 100 CAPSULE, LIQUID FILLED ORAL at 21:19

## 2020-05-13 VITALS
HEIGHT: 68 IN | DIASTOLIC BLOOD PRESSURE: 62 MMHG | TEMPERATURE: 98.3 F | RESPIRATION RATE: 18 BRPM | WEIGHT: 194 LBS | HEART RATE: 81 BPM | SYSTOLIC BLOOD PRESSURE: 111 MMHG | BODY MASS INDEX: 29.4 KG/M2 | OXYGEN SATURATION: 99 %

## 2020-05-13 PROCEDURE — 99024 POSTOP FOLLOW-UP VISIT: CPT | Performed by: OBSTETRICS & GYNECOLOGY

## 2020-05-13 RX ORDER — DIAPER,BRIEF,INFANT-TODD,DISP
1 EACH MISCELLANEOUS 3 TIMES DAILY PRN
Qty: 30 G | Refills: 0
Start: 2020-05-13 | End: 2020-05-19 | Stop reason: ALTCHOICE

## 2020-05-13 RX ORDER — DOCUSATE SODIUM 100 MG/1
100 CAPSULE, LIQUID FILLED ORAL 2 TIMES DAILY
Qty: 10 CAPSULE | Refills: 0
Start: 2020-05-13 | End: 2020-05-19 | Stop reason: ALTCHOICE

## 2020-05-13 RX ADMIN — IBUPROFEN 600 MG: 600 TABLET ORAL at 09:09

## 2020-05-13 RX ADMIN — DOCUSATE SODIUM 100 MG: 100 CAPSULE, LIQUID FILLED ORAL at 09:09

## 2020-05-19 ENCOUNTER — OFFICE VISIT (OUTPATIENT)
Dept: POSTPARTUM | Facility: CLINIC | Age: 40
End: 2020-05-19
Payer: COMMERCIAL

## 2020-05-19 VITALS — SYSTOLIC BLOOD PRESSURE: 132 MMHG | DIASTOLIC BLOOD PRESSURE: 94 MMHG

## 2020-05-19 DIAGNOSIS — R52 PAIN AGGRAVATED BY BREAST FEEDING: ICD-10-CM

## 2020-05-19 DIAGNOSIS — Z71.89 ENCOUNTER FOR BREAST FEEDING COUNSELING: Primary | ICD-10-CM

## 2020-05-19 DIAGNOSIS — O92.79 PAIN AGGRAVATED BY BREAST FEEDING: ICD-10-CM

## 2020-05-19 PROCEDURE — 99211 OFF/OP EST MAY X REQ PHY/QHP: CPT | Performed by: PEDIATRICS

## 2020-05-19 RX ORDER — CHOLECALCIFEROL (VITAMIN D3) 125 MCG
500 CAPSULE ORAL DAILY
COMMUNITY
End: 2021-01-06

## 2020-06-30 ENCOUNTER — POSTPARTUM VISIT (OUTPATIENT)
Dept: OBGYN CLINIC | Facility: CLINIC | Age: 40
End: 2020-06-30

## 2020-06-30 VITALS
DIASTOLIC BLOOD PRESSURE: 80 MMHG | HEIGHT: 68 IN | TEMPERATURE: 97.6 F | WEIGHT: 176 LBS | BODY MASS INDEX: 26.67 KG/M2 | SYSTOLIC BLOOD PRESSURE: 122 MMHG

## 2020-06-30 PROCEDURE — 99024 POSTOP FOLLOW-UP VISIT: CPT | Performed by: OBSTETRICS & GYNECOLOGY

## 2020-12-03 DIAGNOSIS — J45.909 MILD ASTHMA WITHOUT COMPLICATION, UNSPECIFIED WHETHER PERSISTENT: Primary | ICD-10-CM

## 2020-12-03 PROCEDURE — U0003 INFECTIOUS AGENT DETECTION BY NUCLEIC ACID (DNA OR RNA); SEVERE ACUTE RESPIRATORY SYNDROME CORONAVIRUS 2 (SARS-COV-2) (CORONAVIRUS DISEASE [COVID-19]), AMPLIFIED PROBE TECHNIQUE, MAKING USE OF HIGH THROUGHPUT TECHNOLOGIES AS DESCRIBED BY CMS-2020-01-R: HCPCS | Performed by: ANESTHESIOLOGY

## 2020-12-04 LAB — SARS-COV-2 RNA SPEC QL NAA+PROBE: NOT DETECTED

## 2020-12-22 ENCOUNTER — IMMUNIZATIONS (OUTPATIENT)
Dept: FAMILY MEDICINE CLINIC | Facility: HOSPITAL | Age: 40
End: 2020-12-22
Payer: COMMERCIAL

## 2020-12-22 DIAGNOSIS — Z23 ENCOUNTER FOR IMMUNIZATION: ICD-10-CM

## 2020-12-22 PROCEDURE — 91300 SARS-COV-2 / COVID-19 MRNA VACCINE (PFIZER-BIONTECH) 30 MCG: CPT

## 2020-12-22 PROCEDURE — 0001A SARS-COV-2 / COVID-19 MRNA VACCINE (PFIZER-BIONTECH) 30 MCG: CPT

## 2021-01-06 ENCOUNTER — OFFICE VISIT (OUTPATIENT)
Dept: OBGYN CLINIC | Facility: HOSPITAL | Age: 41
End: 2021-01-06
Payer: COMMERCIAL

## 2021-01-06 ENCOUNTER — HOSPITAL ENCOUNTER (OUTPATIENT)
Dept: RADIOLOGY | Facility: HOSPITAL | Age: 41
Discharge: HOME | End: 2021-01-06
Attending: ORTHOPAEDIC SURGERY
Payer: COMMERCIAL

## 2021-01-06 VITALS
HEIGHT: 68 IN | SYSTOLIC BLOOD PRESSURE: 122 MMHG | BODY MASS INDEX: 26.76 KG/M2 | DIASTOLIC BLOOD PRESSURE: 81 MMHG | HEART RATE: 76 BPM

## 2021-01-06 DIAGNOSIS — M25.562 PAIN IN BOTH KNEES, UNSPECIFIED CHRONICITY: ICD-10-CM

## 2021-01-06 DIAGNOSIS — M23.91 KNEE INTERNAL DERANGEMENT, RIGHT: ICD-10-CM

## 2021-01-06 DIAGNOSIS — M25.561 PAIN IN BOTH KNEES, UNSPECIFIED CHRONICITY: Primary | ICD-10-CM

## 2021-01-06 DIAGNOSIS — G89.29 CHRONIC PAIN OF LEFT KNEE: ICD-10-CM

## 2021-01-06 DIAGNOSIS — M25.561 PAIN IN BOTH KNEES, UNSPECIFIED CHRONICITY: ICD-10-CM

## 2021-01-06 DIAGNOSIS — M25.562 PAIN IN BOTH KNEES, UNSPECIFIED CHRONICITY: Primary | ICD-10-CM

## 2021-01-06 DIAGNOSIS — G89.29 CHRONIC PAIN OF RIGHT KNEE: ICD-10-CM

## 2021-01-06 DIAGNOSIS — M25.561 CHRONIC PAIN OF RIGHT KNEE: ICD-10-CM

## 2021-01-06 DIAGNOSIS — M25.562 CHRONIC PAIN OF LEFT KNEE: ICD-10-CM

## 2021-01-06 PROCEDURE — 99203 OFFICE O/P NEW LOW 30 MIN: CPT | Performed by: ORTHOPAEDIC SURGERY

## 2021-01-06 PROCEDURE — 73562 X-RAY EXAM OF KNEE 3: CPT

## 2021-01-06 RX ORDER — TRIAMCINOLONE ACETONIDE 1 MG/G
CREAM TOPICAL
COMMUNITY
Start: 2020-11-21 | End: 2021-05-19

## 2021-01-06 NOTE — PROGRESS NOTES
Assessment:  1  Pain in both knees, unspecified chronicity  XR knee 3 vw right non injury    XR knee 3 vw left non injury   2  Knee internal derangement, right     3  Chronic pain of right knee     4  Chronic pain of left knee         Plan:  The patient is encouraged to do staight leg raises and regular basis for patellofemoral symptoms  The patient presents for right knee pain  Due to nature of symptoms, findings on exam and lack of diagnostic findings on x-ray we will be ordering a right knee MRI to evaluate for meniscal pathology  The patient should complete the MRI and follow up afterward  To do next visit:  Return for follow up after surgery   The above stated was discussed in layman's terms and the patient expressed understanding  All questions were answered to the patient's satisfaction  Scribe Attestation    I,:  Symone Cmapos am acting as a scribe while in the presence of the attending physician :       I,:  Darshan Drew MD personally performed the services described in this documentation    as scribed in my presence :             Subjective:   Jim Cantor is a 38 y o  female who presents for initial evaluation of bilateral knees  She has had symptoms for 5-6 months with following an uphill run  Today she complains of right > left anterior medial and lateral knee pain  She rates the right knee 0-10/10 and left at 0-5/10  Sitting in bent leg positions aggravates and medications alleviate  The right knee can feel unstable  She has used ibuprofen with short-lived benefit  She denies past knee injections or surgery          Review of systems negative unless otherwise specified in HPI    Past Medical History:   Diagnosis Date    31 weeks gestation of pregnancy 2019    Abnormal Pap smear of cervix     Asthma     childhood     3 para 3     History of PCOS     HPV in female     Papanicolaou smear 2018    Polycystic ovary syndrome     Shingles 2019    Varicella     childhood       Past Surgical History:   Procedure Laterality Date     SECTION  2013    COLPOSCOPY      FOOT SURGERY Right     NOSE SURGERY      VAGINAL DELIVERY         Family History   Problem Relation Age of Onset    Miscarriages / Stillbirths Mother     Asthma Brother     Stroke Maternal Grandmother     Hypertension Maternal Grandmother     Hypertension Maternal Grandfather     Heart disease Paternal Grandmother     Heart disease Paternal Grandfather        Social History     Occupational History    Not on file   Tobacco Use    Smoking status: Never Smoker    Smokeless tobacco: Never Used   Substance and Sexual Activity    Alcohol use: No    Drug use: No    Sexual activity: Not Currently     Partners: Male     Birth control/protection: None         Current Outpatient Medications:     Prenatal Multivit-Min-Fe-FA (PRENATAL 1 + IRON PO), Take by mouth, Disp: , Rfl:     triamcinolone (KENALOG) 0 1 % cream, APPLY TO AFFECTED AREA EVERY 4 HOURS AS NEEDED, Disp: , Rfl:     Allergies   Allergen Reactions    Other      seasonal            Vitals:    21 1550   BP: 122/81   Pulse: 76       Objective:  Physical exam  · General: Awake, Alert, Oriented  · Eyes: Pupils equal, round and reactive to light  · Heart: regular rate and rhythm  · Lungs: No audible wheezing  · Abdomen: soft                    Ortho Exam   Bilateral knees:  Mild varus alignment  TTP lateral joint line on right  No erythema or ecchymosis  No effusion or swelling  Normal strength  Good ROM with minimal crepitus   Positive Medial McMurrays on right  Positive Apleys on right   Calf compartments soft and supple  Sensation intact  Toes are warm sensate and mobile      Diagnostics, reviewed and taken today if performed as documented:     The attending physician has personally reviewed the pertinent films in PACS and interpretation is as follows:  Bilateral knee x-rays:  Medial > lateral compartment mild arthritic changes  Procedures, if performed today:    Procedures    None performed      Portions of the record may have been created with voice recognition software  Occasional wrong word or "sound a like" substitutions may have occurred due to the inherent limitations of voice recognition software  Read the chart carefully and recognize, using context, where substitutions have occurred

## 2021-01-11 ENCOUNTER — IMMUNIZATIONS (OUTPATIENT)
Dept: FAMILY MEDICINE CLINIC | Facility: HOSPITAL | Age: 41
End: 2021-01-11
Payer: COMMERCIAL

## 2021-01-11 ENCOUNTER — HOSPITAL ENCOUNTER (OUTPATIENT)
Dept: RADIOLOGY | Facility: HOSPITAL | Age: 41
Discharge: HOME | End: 2021-01-11
Attending: ORTHOPAEDIC SURGERY
Payer: COMMERCIAL

## 2021-01-11 DIAGNOSIS — M23.91 KNEE INTERNAL DERANGEMENT, RIGHT: ICD-10-CM

## 2021-01-11 DIAGNOSIS — M25.561 CHRONIC PAIN OF RIGHT KNEE: ICD-10-CM

## 2021-01-11 DIAGNOSIS — Z23 ENCOUNTER FOR IMMUNIZATION: ICD-10-CM

## 2021-01-11 DIAGNOSIS — G89.29 CHRONIC PAIN OF RIGHT KNEE: ICD-10-CM

## 2021-01-11 PROCEDURE — 73721 MRI JNT OF LWR EXTRE W/O DYE: CPT

## 2021-01-11 PROCEDURE — G1004 CDSM NDSC: HCPCS

## 2021-01-11 PROCEDURE — 0002A SARS-COV-2 / COVID-19 MRNA VACCINE (PFIZER-BIONTECH) 30 MCG: CPT

## 2021-01-11 PROCEDURE — 91300 SARS-COV-2 / COVID-19 MRNA VACCINE (PFIZER-BIONTECH) 30 MCG: CPT

## 2021-01-20 ENCOUNTER — OFFICE VISIT (OUTPATIENT)
Dept: OBGYN CLINIC | Facility: HOSPITAL | Age: 41
End: 2021-01-20
Payer: COMMERCIAL

## 2021-01-20 VITALS
DIASTOLIC BLOOD PRESSURE: 82 MMHG | HEIGHT: 68 IN | SYSTOLIC BLOOD PRESSURE: 121 MMHG | HEART RATE: 85 BPM | WEIGHT: 150 LBS | BODY MASS INDEX: 22.73 KG/M2

## 2021-01-20 DIAGNOSIS — G56.01 CARPAL TUNNEL SYNDROME OF RIGHT WRIST: Primary | ICD-10-CM

## 2021-01-20 DIAGNOSIS — M79.641 RIGHT HAND PAIN: ICD-10-CM

## 2021-01-20 PROCEDURE — 1036F TOBACCO NON-USER: CPT | Performed by: ORTHOPAEDIC SURGERY

## 2021-01-20 PROCEDURE — 99214 OFFICE O/P EST MOD 30 MIN: CPT | Performed by: ORTHOPAEDIC SURGERY

## 2021-01-20 PROCEDURE — 3008F BODY MASS INDEX DOCD: CPT | Performed by: ORTHOPAEDIC SURGERY

## 2021-01-20 NOTE — PROGRESS NOTES
ASSESSMENT/PLAN:    Assessment:   Right carpal tunnel syndrome    Right wrist flexor tendonitis  Right elbow cubital tunnel syndrome    Plan:   Ultrasound of the right carpal tunnel, cubital tunnel and dynamic component of the wrist flexor tendons was ordered today  Patient also met with occupational therapy before leaving today for instruction on a ulnar and median nerve glides  Follow Up: After Testing    To Do Next Visit:  Repeat exam    General Discussions:  Carpal Tunnel Syndrome: The anatomy and physiology of carpal tunnel syndrome was discussed with the patient today  Increase pressure localized under the transverse carpal ligament can cause pain, numbness, tingling, or dysesthesias within the median nerve distribution as well as feelings of fatigue, clumsiness, or awkwardness  These symptoms typically occur at night and worse in the morning upon waking  Eventually, untreated carpal tunnel syndrome can result in weakness and permanent loss of muscle within the thenar compartment of the hand  Treatment options were discussed with the patient  Conservative treatment includes nocturnal resting splints to keep the nerve in a neutral position, ergonomic changes within the work or home environment, activity modification, and tendon gliding exercises  Steroid injections within the carpal canal can help a majority of patients, however this is often self-limited in a majority of patients  Surgical intervention to divide the transverse carpal ligament typically results in a long-lasting relief of the patient's complaints, with the recurrence rate of less than 1%        _____________________________________________________  CHIEF COMPLAINT:  Chief Complaint   Patient presents with    Right Hand - Pain         SUBJECTIVE:  Boby Guardado is a 38y o  year old female who presents for follow up regarding  Her right upper extremity  She was last seen in December 2018 for right hand numbness and tingling  She had an EMG completed few days following this visit which demonstrated carpal tunnel on the right side  She states she has since attempted nighttime splinting with little relief  She notes a new onset of palmar hand pain with extension of her fingers  She attributes this to how she bottle feeds her child possibly  She notes most of her numbness and tingling to her long and ring finger  PAST MEDICAL HISTORY:  Past Medical History:   Diagnosis Date    31 weeks gestation of pregnancy 2019    Abnormal Pap smear of cervix     Asthma     childhood     3 para 3     History of PCOS     HPV in female     Papanicolaou smear 2018    Polycystic ovary syndrome     Shingles     2019    Varicella     childhood       PAST SURGICAL HISTORY:  Past Surgical History:   Procedure Laterality Date     SECTION  2013    COLPOSCOPY      FOOT SURGERY Right     NOSE SURGERY      VAGINAL DELIVERY         FAMILY HISTORY:  Family History   Problem Relation Age of Onset    Rasheeda Maker / Stillbirths Mother     Asthma Brother     Stroke Maternal Grandmother     Hypertension Maternal Grandmother     Hypertension Maternal Grandfather     Heart disease Paternal Grandmother     Heart disease Paternal Grandfather        SOCIAL HISTORY:  Social History     Tobacco Use    Smoking status: Never Smoker    Smokeless tobacco: Never Used   Substance Use Topics    Alcohol use: No    Drug use: No       MEDICATIONS:    Current Outpatient Medications:     Prenatal Multivit-Min-Fe-FA (PRENATAL 1 + IRON PO), Take by mouth, Disp: , Rfl:     triamcinolone (KENALOG) 0 1 % cream, APPLY TO AFFECTED AREA EVERY 4 HOURS AS NEEDED, Disp: , Rfl:     ALLERGIES:  Allergies   Allergen Reactions    Other      seasonal       REVIEW OF SYSTEMS:  Pertinent items are noted in HPI  A comprehensive review of systems was negative      LABS:  HgA1c:   Lab Results   Component Value Date    HGBA1C 5 1 10/28/2015     BMP: Lab Results   Component Value Date    GLUCOSE 76 10/28/2015    CALCIUM 8 5 04/10/2020     10/28/2015    K 4 0 04/10/2020    CO2 25 04/10/2020     04/10/2020    BUN 8 04/10/2020    CREATININE 0 58 (L) 04/10/2020           _____________________________________________________  PHYSICAL EXAMINATION:  General: well developed and well nourished, alert, oriented times 3 and appears comfortable  Psychiatric: Normal  HEENT: Trachea Midline, No torticollis  Cardiovascular: No discernable arrhythmia  Pulmonary: No wheezing or stridor  Skin: No masses, erythema, lacerations, fluctation, ulcerations  Neurovascular: Sensation Intact to the Median, Ulnar, Radial Nerve, Motor Intact to the Median, Ulnar, Radial Nerve and Pulses Intact    MUSCULOSKELETAL EXAMINATION:  Right upper extremity:    Skin is warm, dry and well perfused  No swelling, ecchymosis or erythema  No CMC tenderness  Crepitation noted in wrist flexor tendons  Full composite fist  Positive Tinel's at cubital tunnel, ulnar nerve does not sublux  5/5 Motor to the APB, FDI, FDP2, FDP5, EDC  Sensation intact to light touch in the median, radial, and ulnar nerve distribution    Brisk capillary refill noted in all digits  Palpable distal radial pulse    _____________________________________________________  STUDIES REVIEWED:  EM2018 demonstrates mild carpal tunnel      PROCEDURES PERFORMED:    No Procedures performed today      Scribe Attestation    I,:  Danielle Thomas MA am acting as a scribe while in the presence of the attending physician :       I,:  Lewis Wyman MD personally performed the services described in this documentation    as scribed in my presence :           Scribe Attestation    I,:  Danielle Thomas MA am acting as a scribe while in the presence of the attending physician :       I,:  Lewis Wyman MD personally performed the services described in this documentation    as scribed in my presence :

## 2021-05-19 ENCOUNTER — OFFICE VISIT (OUTPATIENT)
Dept: INTERNAL MEDICINE CLINIC | Facility: CLINIC | Age: 41
End: 2021-05-19
Payer: COMMERCIAL

## 2021-05-19 VITALS
BODY MASS INDEX: 22.91 KG/M2 | HEART RATE: 77 BPM | DIASTOLIC BLOOD PRESSURE: 66 MMHG | WEIGHT: 151.2 LBS | SYSTOLIC BLOOD PRESSURE: 110 MMHG | HEIGHT: 68 IN | OXYGEN SATURATION: 99 %

## 2021-05-19 DIAGNOSIS — J30.2 SEASONAL ALLERGIES: ICD-10-CM

## 2021-05-19 DIAGNOSIS — Z13.0 SCREENING FOR DEFICIENCY ANEMIA: ICD-10-CM

## 2021-05-19 DIAGNOSIS — Z13.220 SCREENING FOR HYPERLIPIDEMIA: ICD-10-CM

## 2021-05-19 DIAGNOSIS — Z00.00 HEALTH MAINTENANCE EXAMINATION: Primary | ICD-10-CM

## 2021-05-19 DIAGNOSIS — Z13.1 SCREENING FOR DIABETES MELLITUS: ICD-10-CM

## 2021-05-19 PROBLEM — O43.193 PLACENTAL CYST AFFECTING PREGNANCY IN THIRD TRIMESTER: Status: RESOLVED | Noted: 2020-01-03 | Resolved: 2021-05-19

## 2021-05-19 PROBLEM — O34.219 HISTORY OF CESAREAN SECTION COMPLICATING PREGNANCY: Status: RESOLVED | Noted: 2017-11-16 | Resolved: 2021-05-19

## 2021-05-19 PROBLEM — R03.0 ELEVATED BLOOD PRESSURE READING: Status: RESOLVED | Noted: 2020-04-16 | Resolved: 2021-05-19

## 2021-05-19 PROCEDURE — 1036F TOBACCO NON-USER: CPT | Performed by: INTERNAL MEDICINE

## 2021-05-19 PROCEDURE — 99396 PREV VISIT EST AGE 40-64: CPT | Performed by: INTERNAL MEDICINE

## 2021-05-19 PROCEDURE — 3008F BODY MASS INDEX DOCD: CPT | Performed by: INTERNAL MEDICINE

## 2021-05-19 PROCEDURE — 3725F SCREEN DEPRESSION PERFORMED: CPT | Performed by: INTERNAL MEDICINE

## 2021-05-19 RX ORDER — LORATADINE 5 MG/1
TABLET, CHEWABLE ORAL
COMMUNITY
Start: 2021-05-16 | End: 2022-05-26

## 2021-05-19 NOTE — PROGRESS NOTES
Assessment/Plan:    Seasonal allergies    Recommend continued use of Claritin 5 mg p r n  for allergies symptom relief  Health maintenance examination    On today's general examination and review of the patient's medical history we find her to be in good general medical health  We have recommended routine blood work including a CBC comprehensive metabolic profile and lipid panel  We will review the results of the studies with the patient when they are completed  Recommend annual health maintenance examination and routine blood work  Diagnoses and all orders for this visit:    Screening for deficiency anemia  -     CBC and differential; Future    Screening for diabetes mellitus  -     Comprehensive metabolic panel; Future    Screening for hyperlipidemia  -     Lipid panel; Future    Other orders  -     loratadine (Claritin [facility]) 5 MG chewable tablet        Subjective:      Patient ID: Michelle Pacheco is a 38 y o  female  This 69-year-old female patient presents today to establish medical care with our practice  She indicates that she has not seen a primary care physician for many years  She is employed as a nurse anesthetist at [employer]  She is the mother of 4 children with her last childbirth within the past year  She has a practicing vegan period    During today's visit I have reviewed with the patient her allergies which are none current medications which are a single medication loratadine 5 mg p r n  allergy symptoms  We reviewed her medical history and surgical history during today's visit  Her family medical history is significant for the presence of heart disease and hypertension  The patient  Has no history of tobacco exposure  She does try to a exercise on a regular basis        The following portions of the patient's history were reviewed and updated as appropriate:   She  has a past medical history of 31 weeks gestation of pregnancy (11/18/2019), Abnormal Pap smear of cervix, Asthma,  3 para 3, History of PCOS, HPV in female, Papanicolaou smear (2018), Polycystic ovary syndrome, Shingles, and Varicella  She   Patient Active Problem List    Diagnosis Date Noted    Seasonal allergies 2021    Health maintenance examination 2019    Ovarian cyst 2019     She  has a past surgical history that includes  section (2013); Foot surgery (Right); Nose surgery; Colposcopy; and Vaginal delivery  Her family history includes Asthma in her brother; Heart disease in her paternal grandfather and paternal grandmother; Hypertension in her maternal grandfather and maternal grandmother; Curtis Haff / [place] in her mother; Stroke in her maternal grandmother  She  reports that she has never smoked  She has never used smokeless tobacco  She reports that she does not drink alcohol or use drugs  Current Outpatient Medications   Medication Sig Dispense Refill    loratadine (Claritin [facility]s) 5 MG chewable tablet        No current facility-administered medications for this visit       Review of Systems   Musculoskeletal: Positive for arthralgias  Bilateral knee pain   All other systems reviewed and are negative  Objective:      /66   Pulse 77   Ht 5' 8" (1 727 m)   Wt 68 6 kg (151 lb 3 2 oz)   SpO2 99%   BMI 22 99 kg/m²          Physical Exam  Vitals signs reviewed  Constitutional:       General: She is not in acute distress  Appearance: Normal appearance  She is well-developed and normal weight  She is not ill-appearing  HENT:      Head: Normocephalic  Right Ear: Hearing, tympanic membrane, ear canal and external ear normal       Left Ear: Hearing, tympanic membrane, ear canal and external ear normal       Nose: Congestion and rhinorrhea present  No mucosal edema  Mouth/Throat:      Mouth: Mucous membranes are moist       Pharynx: Oropharynx is clear  Uvula midline   No oropharyngeal exudate or posterior oropharyngeal erythema  Eyes:      General: Lids are normal  No scleral icterus  Right eye: No discharge  Left eye: No discharge  Extraocular Movements: Extraocular movements intact  Conjunctiva/sclera: Conjunctivae normal       Pupils: Pupils are equal, round, and reactive to light  Neck:      Musculoskeletal: Normal range of motion and neck supple  No neck rigidity or muscular tenderness  Thyroid: No thyromegaly  Vascular: No carotid bruit or JVD  Cardiovascular:      Rate and Rhythm: Normal rate and regular rhythm  Heart sounds: Normal heart sounds  No murmur  Pulmonary:      Effort: Pulmonary effort is normal  No respiratory distress  Breath sounds: Normal breath sounds  No wheezing, rhonchi or rales  Abdominal:      General: Bowel sounds are normal  There is no distension  Palpations: Abdomen is soft  There is no mass  Tenderness: There is no abdominal tenderness  There is no guarding  Musculoskeletal: Normal range of motion  Right lower leg: No edema  Left lower leg: No edema  Lymphadenopathy:      Cervical: No cervical adenopathy  Skin:     General: Skin is warm and dry  Coloration: Skin is not jaundiced or pale  Neurological:      General: No focal deficit present  Mental Status: She is alert and oriented to person, place, and time  Cranial Nerves: No cranial nerve deficit  Sensory: No sensory deficit  Motor: No weakness  Coordination: Coordination normal       Gait: Gait normal       Deep Tendon Reflexes: Reflexes are normal and symmetric  Reflexes normal    Psychiatric:         Mood and Affect: Mood normal          Speech: Speech normal          Behavior: Behavior normal  Behavior is cooperative  Thought Content:  Thought content normal          Judgment: Judgment normal

## 2021-05-19 NOTE — ASSESSMENT & PLAN NOTE
On today's general examination and review of the patient's medical history we find her to be in good general medical health  We have recommended routine blood work including a CBC comprehensive metabolic profile and lipid panel  We will review the results of the studies with the patient when they are completed  Recommend annual health maintenance examination and routine blood work

## 2021-05-20 ENCOUNTER — APPOINTMENT (OUTPATIENT)
Dept: LAB | Facility: HOSPITAL | Age: 41
End: 2021-05-20
Attending: INTERNAL MEDICINE
Payer: COMMERCIAL

## 2021-05-20 DIAGNOSIS — Z13.220 SCREENING FOR HYPERLIPIDEMIA: ICD-10-CM

## 2021-05-20 DIAGNOSIS — Z13.0 SCREENING FOR DEFICIENCY ANEMIA: ICD-10-CM

## 2021-05-20 DIAGNOSIS — Z13.1 SCREENING FOR DIABETES MELLITUS: ICD-10-CM

## 2021-05-20 LAB
ALBUMIN SERPL BCP-MCNC: 4 G/DL (ref 3.5–5)
ALP SERPL-CCNC: 110 U/L (ref 46–116)
ALT SERPL W P-5'-P-CCNC: 22 U/L (ref 12–78)
ANION GAP SERPL CALCULATED.3IONS-SCNC: 8 MMOL/L (ref 4–13)
AST SERPL W P-5'-P-CCNC: 14 U/L (ref 5–45)
BASOPHILS # BLD AUTO: 0.04 THOUSANDS/ΜL (ref 0–0.1)
BASOPHILS NFR BLD AUTO: 1 % (ref 0–1)
BILIRUB SERPL-MCNC: 0.51 MG/DL (ref 0.2–1)
BUN SERPL-MCNC: 14 MG/DL (ref 5–25)
CALCIUM SERPL-MCNC: 9.1 MG/DL (ref 8.3–10.1)
CHLORIDE SERPL-SCNC: 107 MMOL/L (ref 100–108)
CHOLEST SERPL-MCNC: 207 MG/DL (ref 50–200)
CO2 SERPL-SCNC: 28 MMOL/L (ref 21–32)
CREAT SERPL-MCNC: 0.7 MG/DL (ref 0.6–1.3)
EOSINOPHIL # BLD AUTO: 0.14 THOUSAND/ΜL (ref 0–0.61)
EOSINOPHIL NFR BLD AUTO: 4 % (ref 0–6)
ERYTHROCYTE [DISTWIDTH] IN BLOOD BY AUTOMATED COUNT: 12.7 % (ref 11.6–15.1)
GFR SERPL CREATININE-BSD FRML MDRD: 108 ML/MIN/1.73SQ M
GLUCOSE P FAST SERPL-MCNC: 87 MG/DL (ref 65–99)
HCT VFR BLD AUTO: 42.8 % (ref 34.8–46.1)
HDLC SERPL-MCNC: 71 MG/DL
HGB BLD-MCNC: 14.5 G/DL (ref 11.5–15.4)
IMM GRANULOCYTES # BLD AUTO: 0 THOUSAND/UL (ref 0–0.2)
IMM GRANULOCYTES NFR BLD AUTO: 0 % (ref 0–2)
LDLC SERPL CALC-MCNC: 117 MG/DL (ref 0–100)
LYMPHOCYTES # BLD AUTO: 1.75 THOUSANDS/ΜL (ref 0.6–4.47)
LYMPHOCYTES NFR BLD AUTO: 45 % (ref 14–44)
MCH RBC QN AUTO: 29.1 PG (ref 26.8–34.3)
MCHC RBC AUTO-ENTMCNC: 33.9 G/DL (ref 31.4–37.4)
MCV RBC AUTO: 86 FL (ref 82–98)
MONOCYTES # BLD AUTO: 0.38 THOUSAND/ΜL (ref 0.17–1.22)
MONOCYTES NFR BLD AUTO: 10 % (ref 4–12)
NEUTROPHILS # BLD AUTO: 1.56 THOUSANDS/ΜL (ref 1.85–7.62)
NEUTS SEG NFR BLD AUTO: 40 % (ref 43–75)
NONHDLC SERPL-MCNC: 136 MG/DL
NRBC BLD AUTO-RTO: 0 /100 WBCS
PLATELET # BLD AUTO: 229 THOUSANDS/UL (ref 149–390)
PMV BLD AUTO: 10.3 FL (ref 8.9–12.7)
POTASSIUM SERPL-SCNC: 4 MMOL/L (ref 3.5–5.3)
PROT SERPL-MCNC: 7.6 G/DL (ref 6.4–8.2)
RBC # BLD AUTO: 4.98 MILLION/UL (ref 3.81–5.12)
SODIUM SERPL-SCNC: 143 MMOL/L (ref 136–145)
TRIGL SERPL-MCNC: 97 MG/DL
WBC # BLD AUTO: 3.87 THOUSAND/UL (ref 4.31–10.16)

## 2021-05-20 PROCEDURE — 80053 COMPREHEN METABOLIC PANEL: CPT

## 2021-05-20 PROCEDURE — 80061 LIPID PANEL: CPT

## 2021-05-20 PROCEDURE — 36415 COLL VENOUS BLD VENIPUNCTURE: CPT

## 2021-05-20 PROCEDURE — 85025 COMPLETE CBC W/AUTO DIFF WBC: CPT

## 2021-09-25 ENCOUNTER — IMMUNIZATIONS (OUTPATIENT)
Dept: FAMILY MEDICINE CLINIC | Facility: HOSPITAL | Age: 41
End: 2021-09-25

## 2021-09-25 DIAGNOSIS — Z23 ENCOUNTER FOR IMMUNIZATION: Primary | ICD-10-CM

## 2021-09-25 PROCEDURE — 91300 SARS-COV-2 / COVID-19 MRNA VACCINE (PFIZER-BIONTECH) 30 MCG: CPT

## 2021-09-25 PROCEDURE — 0001A SARS-COV-2 / COVID-19 MRNA VACCINE (PFIZER-BIONTECH) 30 MCG: CPT

## 2022-02-11 ENCOUNTER — APPOINTMENT (OUTPATIENT)
Dept: LAB | Facility: CLINIC | Age: 42
End: 2022-02-11
Payer: COMMERCIAL

## 2022-02-11 DIAGNOSIS — O00.90 ECTOPIC PREGNANCY, UNSPECIFIED LOCATION, UNSPECIFIED WHETHER INTRAUTERINE PREGNANCY PRESENT: ICD-10-CM

## 2022-02-11 LAB
ABO GROUP BLD: NORMAL
ALBUMIN SERPL BCP-MCNC: 4.1 G/DL (ref 3.5–5)
ALP SERPL-CCNC: 63 U/L (ref 46–116)
ALT SERPL W P-5'-P-CCNC: 21 U/L (ref 12–78)
ANION GAP SERPL CALCULATED.3IONS-SCNC: 5 MMOL/L (ref 4–13)
AST SERPL W P-5'-P-CCNC: 12 U/L (ref 5–45)
B-HCG SERPL-ACNC: 77 MIU/ML
BASOPHILS # BLD AUTO: 0.02 THOUSANDS/ΜL (ref 0–0.1)
BASOPHILS NFR BLD AUTO: 0 % (ref 0–1)
BILIRUB SERPL-MCNC: 0.47 MG/DL (ref 0.2–1)
BLD GP AB SCN SERPL QL: NEGATIVE
BUN SERPL-MCNC: 15 MG/DL (ref 5–25)
CALCIUM SERPL-MCNC: 9.3 MG/DL (ref 8.3–10.1)
CHLORIDE SERPL-SCNC: 107 MMOL/L (ref 100–108)
CO2 SERPL-SCNC: 26 MMOL/L (ref 21–32)
CREAT SERPL-MCNC: 0.61 MG/DL (ref 0.6–1.3)
EOSINOPHIL # BLD AUTO: 0.09 THOUSAND/ΜL (ref 0–0.61)
EOSINOPHIL NFR BLD AUTO: 2 % (ref 0–6)
ERYTHROCYTE [DISTWIDTH] IN BLOOD BY AUTOMATED COUNT: 13.3 % (ref 11.6–15.1)
GFR SERPL CREATININE-BSD FRML MDRD: 112 ML/MIN/1.73SQ M
GLUCOSE P FAST SERPL-MCNC: 92 MG/DL (ref 65–99)
HCT VFR BLD AUTO: 38.8 % (ref 34.8–46.1)
HGB BLD-MCNC: 13.1 G/DL (ref 11.5–15.4)
IMM GRANULOCYTES # BLD AUTO: 0.01 THOUSAND/UL (ref 0–0.2)
IMM GRANULOCYTES NFR BLD AUTO: 0 % (ref 0–2)
LYMPHOCYTES # BLD AUTO: 2.06 THOUSANDS/ΜL (ref 0.6–4.47)
LYMPHOCYTES NFR BLD AUTO: 38 % (ref 14–44)
MCH RBC QN AUTO: 28.7 PG (ref 26.8–34.3)
MCHC RBC AUTO-ENTMCNC: 33.8 G/DL (ref 31.4–37.4)
MCV RBC AUTO: 85 FL (ref 82–98)
MONOCYTES # BLD AUTO: 0.51 THOUSAND/ΜL (ref 0.17–1.22)
MONOCYTES NFR BLD AUTO: 9 % (ref 4–12)
NEUTROPHILS # BLD AUTO: 2.74 THOUSANDS/ΜL (ref 1.85–7.62)
NEUTS SEG NFR BLD AUTO: 51 % (ref 43–75)
NRBC BLD AUTO-RTO: 0 /100 WBCS
PLATELET # BLD AUTO: 269 THOUSANDS/UL (ref 149–390)
PMV BLD AUTO: 10.7 FL (ref 8.9–12.7)
POTASSIUM SERPL-SCNC: 4.4 MMOL/L (ref 3.5–5.3)
PROGEST SERPL-MCNC: 4.4 NG/ML
PROT SERPL-MCNC: 7.4 G/DL (ref 6.4–8.2)
RBC # BLD AUTO: 4.57 MILLION/UL (ref 3.81–5.12)
RH BLD: POSITIVE
SODIUM SERPL-SCNC: 138 MMOL/L (ref 136–145)
WBC # BLD AUTO: 5.43 THOUSAND/UL (ref 4.31–10.16)

## 2022-02-11 PROCEDURE — 84702 CHORIONIC GONADOTROPIN TEST: CPT

## 2022-02-11 PROCEDURE — 86850 RBC ANTIBODY SCREEN: CPT

## 2022-02-11 PROCEDURE — 36415 COLL VENOUS BLD VENIPUNCTURE: CPT

## 2022-02-11 PROCEDURE — 85025 COMPLETE CBC W/AUTO DIFF WBC: CPT

## 2022-02-11 PROCEDURE — 86901 BLOOD TYPING SEROLOGIC RH(D): CPT

## 2022-02-11 PROCEDURE — 84144 ASSAY OF PROGESTERONE: CPT

## 2022-02-11 PROCEDURE — 80053 COMPREHEN METABOLIC PANEL: CPT

## 2022-02-11 PROCEDURE — 86900 BLOOD TYPING SEROLOGIC ABO: CPT

## 2022-02-14 ENCOUNTER — APPOINTMENT (OUTPATIENT)
Dept: LAB | Facility: CLINIC | Age: 42
End: 2022-02-14
Payer: COMMERCIAL

## 2022-02-14 DIAGNOSIS — O00.90 ECTOPIC PREGNANCY, UNSPECIFIED LOCATION, UNSPECIFIED WHETHER INTRAUTERINE PREGNANCY PRESENT: ICD-10-CM

## 2022-02-14 LAB
B-HCG SERPL-ACNC: 13 MIU/ML
PROGEST SERPL-MCNC: 13.3 NG/ML

## 2022-02-14 PROCEDURE — 84144 ASSAY OF PROGESTERONE: CPT

## 2022-02-14 PROCEDURE — 84702 CHORIONIC GONADOTROPIN TEST: CPT

## 2022-02-14 PROCEDURE — 36415 COLL VENOUS BLD VENIPUNCTURE: CPT

## 2022-02-22 ENCOUNTER — APPOINTMENT (OUTPATIENT)
Dept: LAB | Facility: CLINIC | Age: 42
End: 2022-02-22
Payer: COMMERCIAL

## 2022-02-22 DIAGNOSIS — O03.9 SPONTANEOUS PREGNANCY LOSS: ICD-10-CM

## 2022-02-22 LAB — B-HCG SERPL-ACNC: <2 MIU/ML

## 2022-02-22 PROCEDURE — 36415 COLL VENOUS BLD VENIPUNCTURE: CPT

## 2022-02-22 PROCEDURE — 84702 CHORIONIC GONADOTROPIN TEST: CPT

## 2022-05-26 ENCOUNTER — OFFICE VISIT (OUTPATIENT)
Dept: INTERNAL MEDICINE CLINIC | Facility: CLINIC | Age: 42
End: 2022-05-26
Payer: COMMERCIAL

## 2022-05-26 VITALS
HEART RATE: 77 BPM | HEIGHT: 68 IN | OXYGEN SATURATION: 99 % | DIASTOLIC BLOOD PRESSURE: 70 MMHG | WEIGHT: 137 LBS | BODY MASS INDEX: 20.76 KG/M2 | TEMPERATURE: 97.8 F | SYSTOLIC BLOOD PRESSURE: 118 MMHG

## 2022-05-26 DIAGNOSIS — Z13.220 SCREENING FOR HYPERLIPIDEMIA: ICD-10-CM

## 2022-05-26 DIAGNOSIS — J30.2 SEASONAL ALLERGIES: ICD-10-CM

## 2022-05-26 DIAGNOSIS — Z00.00 HEALTH MAINTENANCE EXAMINATION: Primary | ICD-10-CM

## 2022-05-26 PROCEDURE — 3008F BODY MASS INDEX DOCD: CPT | Performed by: INTERNAL MEDICINE

## 2022-05-26 PROCEDURE — 99396 PREV VISIT EST AGE 40-64: CPT | Performed by: INTERNAL MEDICINE

## 2022-05-26 PROCEDURE — 3725F SCREEN DEPRESSION PERFORMED: CPT | Performed by: INTERNAL MEDICINE

## 2022-05-26 NOTE — ASSESSMENT & PLAN NOTE
On today's general examination of this patient we find her to be in excellent health  She practices a healthy lifestyle maintaining healthy weight regular exercise and vegan diet  Recommend a lipid profile for completeness previous study was slightly elevated but followed a pregnancy

## 2022-05-26 NOTE — PROGRESS NOTES
Assessment/Plan:    Health maintenance examination  On today's general examination of this patient we find her to be in excellent health  She practices a healthy lifestyle maintaining healthy weight regular exercise and vegan diet  Recommend a lipid profile for completeness previous study was slightly elevated but followed a pregnancy  Seasonal allergies  Mild seasonal allergies noted on examination recommend OTC antihistamine of patient's choice    Screening for hyperlipidemia  Previous mild hyperlipidemia reviewed with the patient but did follow a pregnancy  Updated study has been requested  Diagnoses and all orders for this visit:    Screening for hyperlipidemia  -     Lipid panel; Future        Subjective:      Patient ID: Delicia Palomo is a 42 y o  female  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 for an annual physical examination and review of her general health  She indicates that she has been feeling well and has no current issues  She did have a miscarriage earlier this year  She has 4 children ranging from the age of 2 to 6  She continues to practice a vegan dietary lifestyle  She does exercise on a regular basis  We discussed the benefits of receiving a 4th COVID vaccine shot since she is in healthcare      The following portions of the patient's history were reviewed and updated as appropriate:   She  has a past medical history of 31 weeks gestation of pregnancy (2019), Abnormal Pap smear of cervix, Asthma,  3 para 3, History of PCOS, HPV in female, Papanicolaou smear (2018), Polycystic ovary syndrome, Shingles, and Varicella  She   Patient Active Problem List    Diagnosis Date Noted    Screening for hyperlipidemia 2022    Seasonal allergies 2021    Health maintenance examination 2019    Ovarian cyst 2019     She  has a past surgical history that includes  section (); Foot surgery (Right);  Nose surgery; Colposcopy; and Vaginal delivery  Her family history includes Asthma in her brother; Heart disease in her paternal grandfather and paternal grandmother; Hypertension in her maternal grandfather and maternal grandmother; Rodrigo Mangle / [place] in her mother; Stroke in her maternal grandmother  She  reports that she has never smoked  She has never used smokeless tobacco  She reports that she does not drink alcohol and does not use drugs  No current outpatient medications on file  No current facility-administered medications for this visit       Review of Systems   All other systems reviewed and are negative  Objective:      /70   Pulse 77   Temp 97 8 °F (36 6 °C)   Ht 5' 8" (1 727 m)   Wt 62 1 kg (137 lb)   SpO2 99%   BMI 20 83 kg/m²          Physical Exam  Vitals reviewed  Constitutional:       General: She is not in acute distress  Appearance: Normal appearance  She is well-developed  She is not ill-appearing  HENT:      Head: Normocephalic  Right Ear: Hearing, tympanic membrane, ear canal and external ear normal       Left Ear: Hearing, tympanic membrane, ear canal and external ear normal       Nose: Congestion and rhinorrhea present  No mucosal edema  Comments: Allergy irritation in the nasal passages     Mouth/Throat:      Mouth: Mucous membranes are moist       Pharynx: Oropharynx is clear  Uvula midline  Eyes:      General: Lids are normal          Right eye: No discharge  Left eye: No discharge  Extraocular Movements: Extraocular movements intact  Conjunctiva/sclera: Conjunctivae normal       Pupils: Pupils are equal, round, and reactive to light  Neck:      Thyroid: No thyromegaly  Vascular: No carotid bruit or JVD  Cardiovascular:      Rate and Rhythm: Normal rate and regular rhythm  Heart sounds: Normal heart sounds  No murmur heard  Pulmonary:      Effort: Pulmonary effort is normal  No respiratory distress        Breath sounds: Normal breath sounds  No wheezing, rhonchi or rales  Abdominal:      General: Bowel sounds are normal  There is no distension  Palpations: Abdomen is soft  There is no mass  Tenderness: There is no abdominal tenderness  There is no guarding  Musculoskeletal:         General: No swelling or tenderness  Normal range of motion  Cervical back: Normal range of motion and neck supple  No rigidity or tenderness  Right lower leg: No edema  Left lower leg: No edema  Lymphadenopathy:      Cervical: No cervical adenopathy  Skin:     General: Skin is warm and dry  Capillary Refill: Capillary refill takes less than 2 seconds  Coloration: Skin is not jaundiced or pale  Neurological:      General: No focal deficit present  Mental Status: She is alert and oriented to person, place, and time  Mental status is at baseline  Deep Tendon Reflexes: Reflexes are normal and symmetric  Reflexes normal    Psychiatric:         Mood and Affect: Mood normal          Speech: Speech normal          Behavior: Behavior normal  Behavior is cooperative  Thought Content:  Thought content normal          Judgment: Judgment normal

## 2022-06-08 ENCOUNTER — VBI (OUTPATIENT)
Dept: ADMINISTRATIVE | Facility: OTHER | Age: 42
End: 2022-06-08

## 2022-07-25 ENCOUNTER — APPOINTMENT (OUTPATIENT)
Dept: LAB | Facility: HOSPITAL | Age: 42
End: 2022-07-25
Attending: INTERNAL MEDICINE
Payer: COMMERCIAL

## 2022-07-25 DIAGNOSIS — Z13.220 SCREENING FOR HYPERLIPIDEMIA: ICD-10-CM

## 2022-07-25 LAB
CHOLEST SERPL-MCNC: 164 MG/DL
HDLC SERPL-MCNC: 69 MG/DL
LDLC SERPL CALC-MCNC: 79 MG/DL (ref 0–100)
NONHDLC SERPL-MCNC: 95 MG/DL
TRIGL SERPL-MCNC: 81 MG/DL

## 2022-07-25 PROCEDURE — 36415 COLL VENOUS BLD VENIPUNCTURE: CPT

## 2022-07-25 PROCEDURE — 80061 LIPID PANEL: CPT

## 2022-07-31 ENCOUNTER — APPOINTMENT (OUTPATIENT)
Dept: RADIOLOGY | Facility: CLINIC | Age: 42
End: 2022-07-31
Payer: COMMERCIAL

## 2022-07-31 ENCOUNTER — OFFICE VISIT (OUTPATIENT)
Dept: URGENT CARE | Facility: CLINIC | Age: 42
End: 2022-07-31
Payer: COMMERCIAL

## 2022-07-31 VITALS
HEART RATE: 74 BPM | SYSTOLIC BLOOD PRESSURE: 121 MMHG | OXYGEN SATURATION: 100 % | TEMPERATURE: 96.7 F | DIASTOLIC BLOOD PRESSURE: 67 MMHG | RESPIRATION RATE: 18 BRPM

## 2022-07-31 DIAGNOSIS — M79.644 FINGER PAIN, RIGHT: ICD-10-CM

## 2022-07-31 DIAGNOSIS — S69.91XA FINGER INJURY, RIGHT, INITIAL ENCOUNTER: Primary | ICD-10-CM

## 2022-07-31 PROCEDURE — 99213 OFFICE O/P EST LOW 20 MIN: CPT | Performed by: PHYSICIAN ASSISTANT

## 2022-07-31 PROCEDURE — 73140 X-RAY EXAM OF FINGER(S): CPT

## 2022-07-31 NOTE — PROGRESS NOTES
[facility]        NAME: Lola Ding is a 42 y o  female  : 1980    MRN: [id number]  DATE: 2022  TIME: 2:59 PM    Assessment and Plan   Finger injury, right, initial encounter [S69 [id number]]  1  Finger injury, right, initial encounter  XR finger right third digit-middle    Ambulatory Referral to Hand Surgery         Patient Instructions     Recommend RICE, OTC ibuprofen/tylenol  Referred to Hand Surg  Monitor for worsening symptoms  Splint applied  Follow up with PCP in 3-5 days  Proceed to  ER if symptoms worsen  Chief Complaint     Chief Complaint   Patient presents with    Hand Pain     Patient c/o R middle finger pain after her son dropped a 8 lb weight on her finger this morning  History of Present Illness       Patient presents with complaint of right middle finger pain since earlier today  She states that she was exercising when her son dropped an 8 lb weight on her middle finger while her hand was on the ground  She reports immediate pain followed by swelling and bruising  She describes the pain as throbbing and constant  She states that the only thing that helps is to elevate it above her heart  She reports that she tried ice but the pressure exacerbated her pain  She reports some tingling in the tip of her finger and slightly limited range of motion  She is right-hand dominant  Review of Systems   Review of Systems   Constitutional: Negative for chills and fever  HENT: Negative for ear pain and sore throat  Eyes: Negative for pain and visual disturbance  Respiratory: Negative for cough and shortness of breath  Cardiovascular: Negative for chest pain and palpitations  Gastrointestinal: Negative for abdominal pain and vomiting  Genitourinary: Negative for dysuria and hematuria  Musculoskeletal: Positive for arthralgias and joint swelling  Negative for back pain  Skin: Negative for color change and rash     Neurological: Negative for seizures and syncope  All other systems reviewed and are negative  Current Medications     No current outpatient medications on file  Current Allergies     Allergies as of 2022 - Reviewed 2022   Allergen Reaction Noted    Other  2020            The following portions of the patient's history were reviewed and updated as appropriate: allergies, current medications, past family history, past medical history, past social history, past surgical history and problem list      Past Medical History:   Diagnosis Date    31 weeks gestation of pregnancy 2019    Abnormal Pap smear of cervix     Asthma     childhood     3 para 3     History of PCOS     HPV in female     Papanicolaou smear 2018    Polycystic ovary syndrome     Shingles     2019    Varicella     childhood       Past Surgical History:   Procedure Laterality Date     SECTION  2013    COLPOSCOPY      FOOT SURGERY Right     NOSE SURGERY      VAGINAL DELIVERY         Family History   Problem Relation Age of Onset    Miscarriages / [place] Mother     Asthma Brother     Stroke Maternal Grandmother     Hypertension Maternal Grandmother     Hypertension Maternal Grandfather     Heart disease Paternal Grandmother     Heart disease Paternal Grandfather          Medications have been verified  Objective   /67   Pulse 74   Temp (!) 96 7 °F (35 9 °C)   Resp 18   SpO2 100%   No LMP recorded  Physical Exam     Physical Exam  Vitals and nursing note reviewed  Constitutional:       General: She is not in acute distress  Appearance: Normal appearance  She is well-developed  She is not ill-appearing or diaphoretic  HENT:      Head: Normocephalic and atraumatic  Eyes:      Conjunctiva/sclera: Conjunctivae normal       Pupils: Pupils are equal, round, and reactive to light  Cardiovascular:      Rate and Rhythm: Normal rate and regular rhythm        Heart sounds: Normal heart sounds  Pulmonary:      Effort: Pulmonary effort is normal  No respiratory distress  Breath sounds: Normal breath sounds  No stridor  No wheezing, rhonchi or rales  Musculoskeletal:         General: Swelling and tenderness present  Cervical back: Normal range of motion and neck supple  Comments: Swelling & ecchymosis of distal phalanx of right 3rd digit; cap refill <2; AROM limited d/t pain; very TTP   Lymphadenopathy:      Cervical: No cervical adenopathy  Skin:     General: Skin is warm and dry  Capillary Refill: Capillary refill takes less than 2 seconds  Findings: Bruising present  No rash  Neurological:      Mental Status: She is alert and oriented to person, place, and time  Cranial Nerves: No cranial nerve deficit  Sensory: No sensory deficit  Psychiatric:         Behavior: Behavior normal          Thought Content:  Thought content normal

## 2022-08-01 ENCOUNTER — OFFICE VISIT (OUTPATIENT)
Dept: OBGYN CLINIC | Facility: CLINIC | Age: 42
End: 2022-08-01
Payer: COMMERCIAL

## 2022-08-01 ENCOUNTER — TELEPHONE (OUTPATIENT)
Dept: OBGYN CLINIC | Facility: MEDICAL CENTER | Age: 42
End: 2022-08-01

## 2022-08-01 VITALS
SYSTOLIC BLOOD PRESSURE: 112 MMHG | DIASTOLIC BLOOD PRESSURE: 70 MMHG | BODY MASS INDEX: 20.95 KG/M2 | HEIGHT: 68 IN | WEIGHT: 138.2 LBS

## 2022-08-01 DIAGNOSIS — S62.662A CLOSED NONDISPLACED FRACTURE OF DISTAL PHALANX OF RIGHT MIDDLE FINGER, INITIAL ENCOUNTER: Primary | ICD-10-CM

## 2022-08-01 DIAGNOSIS — S69.91XA FINGER INJURY, RIGHT, INITIAL ENCOUNTER: ICD-10-CM

## 2022-08-01 PROCEDURE — 99214 OFFICE O/P EST MOD 30 MIN: CPT | Performed by: ORTHOPAEDIC SURGERY

## 2022-08-01 NOTE — PROGRESS NOTES
ASSESSMENT/PLAN:    Assessment:   Right long finger tuft fracture    Plan:   Stax splint 3-4 weeks    Follow Up:  4 weeks with x-rays of the right long finger      General Discussions:  Fracture - Nonoperative Care: The physiology of a fractured bone was discussed with the patient today  With nondisplaced or minimally displaced fractures, conservative treatment often results in a functional recovery  Typically, these fractures are immobilized in either a cast or splint depending on the pattern  Radiographs are typically taken at intervals throughout the fracture healing to ensure that muscular forces do not cause loss of reduction or alignment  If the fracture loses its alignment, surgical intervention may be required to stabilize it  Medical conditions such as diabetes, osteoporosis, vitamin D deficiency, and a history of or exposure to smoking may delay or prevent fracture healing       _____________________________________________________  CHIEF COMPLAINT:  No chief complaint on file  SUBJECTIVE:  Stefano Lovett is a 42 y o  female who presents with Fracture to the right long finger  This started  1 day(s) ago as Due to a personal injury    Patient reports her son accidentally dropped an eight pound weight on her finger  Radiation: None  Previous Treatments: urgent care splinted with only partial relief  Associated symptoms: Pain  Moderate  Intermittant  Sharp, Dull and Aching  Handedness: right  Work status: [employer]     PAST MEDICAL HISTORY:  Past Medical History:   Diagnosis Date    31 weeks gestation of pregnancy 2019    Abnormal Pap smear of cervix     Asthma     childhood     3 para 3     History of PCOS     HPV in female     Papanicolaou smear 2018    Polycystic ovary syndrome     Shingles     2019    Varicella     childhood       PAST SURGICAL HISTORY:  Past Surgical History:   Procedure Laterality Date     SECTION  2013    COLPOSCOPY      FOOT SURGERY Right     NOSE SURGERY      VAGINAL DELIVERY         FAMILY HISTORY:  Family History   Problem Relation Age of Onset   Nancy Olson / [place] Mother     Asthma Brother     Stroke Maternal Grandmother     Hypertension Maternal Grandmother     Hypertension Maternal Grandfather     Heart disease Paternal Grandmother     Heart disease Paternal Grandfather        SOCIAL HISTORY:  Social History     Tobacco Use    Smoking status: Never Smoker    Smokeless tobacco: Never Used   Vaping Use    Vaping Use: Never used   Substance Use Topics    Alcohol use: No    Drug use: No       MEDICATIONS:  No current outpatient medications on file  ALLERGIES:  Allergies   Allergen Reactions    Other      seasonal       REVIEW OF SYSTEMS:  Pertinent items are noted in HPI  A comprehensive review of systems was negative      LABS:  HgA1c:   Lab Results   Component Value Date    HGBA1C 5 1 10/28/2015     BMP:   Lab Results   Component Value Date    GLUCOSE 76 10/28/2015    CALCIUM 9 3 02/11/2022     10/28/2015    K 4 4 02/11/2022    CO2 26 02/11/2022     02/11/2022    BUN 15 02/11/2022    CREATININE 0 61 02/11/2022         _____________________________________________________  PHYSICAL EXAMINATION:  Vital signs: /70   Ht 5' 8" (1 727 m)   Wt 62 7 kg (138 lb 3 2 oz)   BMI 21 01 kg/m²   General: well developed and well nourished, alert, oriented times 3 and appears comfortable  Psychiatric: Normal  HEENT: Trachea Midline, No torticollis  Cardiovascular: No discernable arrhythmia  Pulmonary: No wheezing or stridor  Abdomen: No rebound or guarding  Extremities: No peripheral edema  Skin: No masses, erythema, lacerations, fluctation, ulcerations  Neurovascular: Sensation Intact to the Median, Ulnar, Radial Nerve, Motor Intact to the Median, Ulnar, Radial Nerve and Pulses Intact    MUSCULOSKELETAL EXAMINATION:  Right long finger:  Diffuse swelling and ecchymosis over the distal phalanx of the right long finger  Nail bed is intact  No evidence of laceration or ulceration  Positive TTP fracture site  Flexor digitorum profundus and extensor tendons are intact  No evidence of mallet deformity  _____________________________________________________  STUDIES REVIEWED:  Images were reviewed in PACS by Dr Vineet Ha and demonstrate: Right long finger minimally displaced distal phalanx fracture located at the distal aspect of the distal phalanx        PROCEDURES PERFORMED:  Procedures  No Procedures performed today   Scribe Attestation    I,:  Pool Obrien am acting as a scribe while in the presence of the attending physician :       I,:  Last Duarte MD personally performed the services described in this documentation    as scribed in my presence :

## 2022-10-11 PROBLEM — Z13.220 SCREENING FOR HYPERLIPIDEMIA: Status: RESOLVED | Noted: 2022-05-26 | Resolved: 2022-10-11

## 2022-10-12 PROBLEM — Z00.00 HEALTH MAINTENANCE EXAMINATION: Status: RESOLVED | Noted: 2019-11-18 | Resolved: 2022-10-12

## 2022-11-21 DIAGNOSIS — Z12.31 ENCOUNTER FOR SCREENING MAMMOGRAM FOR MALIGNANT NEOPLASM OF BREAST: Primary | ICD-10-CM

## 2023-03-13 ENCOUNTER — HOSPITAL ENCOUNTER (OUTPATIENT)
Dept: MAMMOGRAPHY | Facility: IMAGING CENTER | Age: 43
Discharge: HOME | End: 2023-03-13

## 2023-03-13 VITALS — BODY MASS INDEX: 20.46 KG/M2 | HEIGHT: 68 IN | WEIGHT: 135 LBS

## 2023-03-13 DIAGNOSIS — Z12.31 ENCOUNTER FOR SCREENING MAMMOGRAM FOR MALIGNANT NEOPLASM OF BREAST: ICD-10-CM

## 2023-05-22 ENCOUNTER — RA CDI HCC (OUTPATIENT)
Dept: OTHER | Facility: HOSPITAL | Age: 43
End: 2023-05-22

## 2023-05-22 NOTE — PROGRESS NOTES
[facility]  coding opportunities       Chart reviewed, no opportunity found: CHART REVIEWED, NO OPPORTUNITY FOUND        Patients Insurance        Commercial Insurance: [address] [place] [address]

## 2023-06-01 ENCOUNTER — OFFICE VISIT (OUTPATIENT)
Dept: INTERNAL MEDICINE CLINIC | Facility: CLINIC | Age: 43
End: 2023-06-01

## 2023-06-01 VITALS
WEIGHT: 138.4 LBS | SYSTOLIC BLOOD PRESSURE: 116 MMHG | HEART RATE: 79 BPM | TEMPERATURE: 98.2 F | HEIGHT: 68 IN | OXYGEN SATURATION: 99 % | BODY MASS INDEX: 20.98 KG/M2 | DIASTOLIC BLOOD PRESSURE: 72 MMHG

## 2023-06-01 DIAGNOSIS — J30.2 SEASONAL ALLERGIES: ICD-10-CM

## 2023-06-01 DIAGNOSIS — Z00.00 HEALTHCARE MAINTENANCE: Primary | ICD-10-CM

## 2023-06-01 DIAGNOSIS — D17.23 LIPOMA OF RIGHT LOWER EXTREMITY: ICD-10-CM

## 2023-06-01 RX ORDER — ONDANSETRON 4 MG/1
4 TABLET, ORALLY DISINTEGRATING ORAL EVERY 6 HOURS PRN
COMMUNITY
Start: 2023-03-10 | End: 2023-06-01 | Stop reason: ALTCHOICE

## 2023-06-01 RX ORDER — AZITHROMYCIN 250 MG/1
TABLET, FILM COATED ORAL
COMMUNITY
Start: 2023-05-12 | End: 2023-06-01 | Stop reason: ALTCHOICE

## 2023-06-01 RX ORDER — OLOPATADINE HYDROCHLORIDE 1 MG/ML
1 SOLUTION/ DROPS OPHTHALMIC 2 TIMES DAILY
COMMUNITY

## 2023-06-01 RX ORDER — CETIRIZINE HYDROCHLORIDE 10 MG/1
10 TABLET ORAL DAILY
COMMUNITY

## 2023-06-01 NOTE — ASSESSMENT & PLAN NOTE
Seasonal allergies noted on examination with nasal congestion recommend continuation of antihistamine topical steroids and Pataday

## 2023-06-01 NOTE — PROGRESS NOTES
Name: Chad Zhao      : 1980      MRN: [id number]  Encounter Provider: José Luis Paredes MD  Encounter Date: 2023   Encounter department: [address]     1  Lipoma of right lower extremity  Assessment & Plan:  Probable lipoma right thigh region patient considering surgical excision  2  Seasonal allergies  Assessment & Plan:  Seasonal allergies noted on examination with nasal congestion recommend continuation of antihistamine topical steroids and Pataday  3  Healthcare maintenance  Assessment & Plan: Today's examination of the patient and review of symptoms indicates a healthy person with good eating habits and maintaining regular exercise  Dharmesh Tapia Amend continuation of all of the lifestyle traits  Follow-up in 1 year recommended no need for updated blood work at this time will be due for next set of blood work in 1 year           Subjective      This pleasant 25-year-old female patient presents today for an annual physical examination  She indicates that she has been feeling well but has been experiencing an increase in allergy symptoms this spring  Currently using Zyrtec along with nasal steroids to manage her symptoms for nasal congestion  She is using Pataday to treat her eye irritation  She continues with a vegan diet and also regular exercise  Weight is in good range  Review of Systems   HENT: Positive for congestion and rhinorrhea  All other systems reviewed and are negative        Current Outpatient Medications on File Prior to Visit   Medication Sig   • cetirizine (ZyrTEC) 10 mg tablet Take 10 mg by mouth daily   • olopatadine (PATANOL) 0 1 % ophthalmic solution 1 drop 2 (two) times a day   • [DISCONTINUED] azithromycin (ZITHROMAX) 250 mg tablet TAKE 2 TABLETS BY MOUTH TODAY, THEN TAKE 1 TABLET DAILY FOR 4 DAYS   • [DISCONTINUED] ondansetron (ZOFRAN-ODT) 4 mg disintegrating tablet Take 4 mg by mouth every 6 (six) hours "as needed       Objective     /72   Pulse 79   Temp 98 2 °F (36 8 °C)   Ht 5' 8\" (1 727 m)   Wt 62 8 kg (138 lb 6 4 oz)   SpO2 99%   BMI 21 04 kg/m²     Physical Exam  Vitals reviewed  Constitutional:       General: She is not in acute distress  Appearance: Normal appearance  She is well-developed  She is not ill-appearing  HENT:      Head: Normocephalic  Right Ear: Hearing, tympanic membrane, ear canal and external ear normal       Left Ear: Hearing, tympanic membrane, ear canal and external ear normal       Nose: Nose normal  No mucosal edema  Mouth/Throat:      Mouth: Mucous membranes are moist       Pharynx: Oropharynx is clear  Uvula midline  Eyes:      General: Lids are normal          Right eye: No discharge  Left eye: No discharge  Extraocular Movements: Extraocular movements intact  Conjunctiva/sclera: Conjunctivae normal       Pupils: Pupils are equal, round, and reactive to light  Neck:      Thyroid: No thyromegaly  Vascular: No carotid bruit or JVD  Cardiovascular:      Rate and Rhythm: Normal rate and regular rhythm  Heart sounds: Normal heart sounds  No murmur heard  Pulmonary:      Effort: Pulmonary effort is normal  No respiratory distress  Breath sounds: Normal breath sounds  No wheezing, rhonchi or rales  Abdominal:      General: Bowel sounds are normal  There is no distension  Palpations: Abdomen is soft  There is no mass  Tenderness: There is no abdominal tenderness  There is no guarding  Musculoskeletal:         General: No swelling or tenderness  Normal range of motion  Cervical back: Normal range of motion and neck supple  No rigidity or tenderness  Right lower leg: No edema  Left lower leg: No edema  Comments: Soft mass right thigh area seems to be consistent with lipoma   Lymphadenopathy:      Cervical: No cervical adenopathy  Skin:     General: Skin is warm and dry        Coloration: " Skin is not jaundiced or pale  Neurological:      General: No focal deficit present  Mental Status: She is alert and oriented to person, place, and time  Deep Tendon Reflexes: Reflexes are normal and symmetric  Reflexes normal    Psychiatric:         Mood and Affect: Mood normal          Speech: Speech normal          Behavior: Behavior normal  Behavior is cooperative  Thought Content:  Thought content normal          Judgment: Judgment normal        Jean Claude Charles MD

## 2023-06-01 NOTE — ASSESSMENT & PLAN NOTE
Today's examination of the patient and review of symptoms indicates a healthy person with good eating habits and maintaining regular exercise  Kristen Can Amend continuation of all of the lifestyle traits    Follow-up in 1 year recommended no need for updated blood work at this time will be due for next set of blood work in 1 year

## 2023-07-31 PROBLEM — Z00.00 HEALTHCARE MAINTENANCE: Status: RESOLVED | Noted: 2023-06-01 | Resolved: 2023-07-31

## 2025-01-22 ENCOUNTER — HOSPITAL ENCOUNTER (OUTPATIENT)
Dept: RADIOLOGY | Facility: HOSPITAL | Age: 45
Discharge: HOME | End: 2025-01-22
Attending: PSYCHIATRY & NEUROLOGY
Payer: COMMERCIAL

## 2025-01-22 ENCOUNTER — TELEPHONE (OUTPATIENT)
Dept: NEUROLOGY | Facility: CLINIC | Age: 45
End: 2025-01-22

## 2025-01-22 ENCOUNTER — OFFICE VISIT (OUTPATIENT)
Dept: NEUROLOGY | Facility: CLINIC | Age: 45
End: 2025-01-22
Payer: COMMERCIAL

## 2025-01-22 VITALS
OXYGEN SATURATION: 97 % | BODY MASS INDEX: 21.55 KG/M2 | SYSTOLIC BLOOD PRESSURE: 112 MMHG | WEIGHT: 142.2 LBS | HEART RATE: 71 BPM | DIASTOLIC BLOOD PRESSURE: 80 MMHG | HEIGHT: 68 IN | TEMPERATURE: 97.8 F

## 2025-01-22 DIAGNOSIS — G45.9 TIA (TRANSIENT ISCHEMIC ATTACK): ICD-10-CM

## 2025-01-22 DIAGNOSIS — G45.9 TIA (TRANSIENT ISCHEMIC ATTACK): Primary | ICD-10-CM

## 2025-01-22 PROCEDURE — 70498 CT ANGIOGRAPHY NECK: CPT

## 2025-01-22 PROCEDURE — 99204 OFFICE O/P NEW MOD 45 MIN: CPT | Performed by: PSYCHIATRY & NEUROLOGY

## 2025-01-22 PROCEDURE — 70496 CT ANGIOGRAPHY HEAD: CPT

## 2025-01-22 PROCEDURE — 70551 MRI BRAIN STEM W/O DYE: CPT

## 2025-01-22 RX ADMIN — IOHEXOL 75 ML: 350 INJECTION, SOLUTION INTRAVENOUS at 17:38

## 2025-01-22 NOTE — ASSESSMENT & PLAN NOTE
"Unclear episode, concern for vasovagal pre-syncope vs vasospasm from excitement, less likely TIA, CVA or partial seizure.  -Will get CTA stat and MRI stat  EEG as well in the future  Orders:    CTA head and neck w wo contrast; Future    EEG Routine and awake; Future    MRI brain without contrast; Future    Follow up in 2 weeks to discuss any other treatment options.    I would be happy to see the patient sooner if any new questions/concerns arise.  Patient/Guardian was advised to the call the office if they have any questions and concerns in the meantime.     We discussed \"red flag\" headache symptoms including symptoms such as facial droop on one side, weakness/paralysis on either side, speech trouble, numbness on one side, balance issues, any vision changes, extreme dizziness or significant increase in severity of headache or a sudden onset severe headache, patient is to call 9-1-1 immediately or to proceed to the nearest ER.     "

## 2025-01-22 NOTE — PROGRESS NOTES
"Name: Amira Jesus      : 1980      MRN: [id number]  Encounter Provider: Kathy Alvarado MD  Encounter Date: 2025   Encounter department: [facility] [place]  :  Assessment & Plan  TIA (transient ischemic attack)  Unclear episode, concern for vasovagal pre-syncope vs vasospasm from excitement, less likely TIA, CVA or partial seizure.  -Will get CTA stat and MRI stat  EEG as well in the future  Orders:    CTA head and neck w wo contrast; Future    EEG Routine and awake; Future    MRI brain without contrast; Future    Follow up in 2 weeks to discuss any other treatment options.    I would be happy to see the patient sooner if any new questions/concerns arise.  Patient/Guardian was advised to the call the office if they have any questions and concerns in the meantime.     We discussed \"red flag\" headache symptoms including symptoms such as facial droop on one side, weakness/paralysis on either side, speech trouble, numbness on one side, balance issues, any vision changes, extreme dizziness or significant increase in severity of headache or a sudden onset severe headache, patient is to call  immediately or to proceed to the nearest ER.           History of Present Illness   HPI    This is a 45 y/o  Female who is here as a new patient to establish care with us     Saw the incident on the phone which was recorded by the cameras in her house.  She was watching the [employer] game on  and she was screaming, and excited, during touchdown and she jumped, and she got lightheadedness, and she went to take a few steps, and noticed that she had no movement on the R side of her body, and fell down as a result and then noticed right side was shaking.  The whole episode lasted for few minutes.  Did not have any loss of consciousness during this episode there was no urinary incontinence tongue bite noted she remembers the whole event.  This is never happened before she is " "very healthy has no risk factors no family history of any stroke or CVA.  She works out regularly.  She did not seek medical attention at that time but happened to mention to Dr. Díaz who wanted her to be evaluated.  No further episodes since then.    Review of Systems   Constitutional: Negative.    HENT: Negative.     Eyes: Negative.    Respiratory: Negative.     Cardiovascular: Negative.    Gastrointestinal: Negative.    Endocrine: Negative.    Genitourinary: Negative.    Musculoskeletal: Negative.    Skin: Negative.    Allergic/Immunologic: Negative.    Hematological: Negative.    Psychiatric/Behavioral: Negative.     All other systems reviewed and are negative.   I have personally reviewed the MA's review of systems and made changes as necessary.         Objective   /80 (Patient Position: Sitting, Cuff Size: Standard)   Pulse 71   Temp 97.8 °F (36.6 °C) (Temporal)   Ht 5' 8\" (1.727 m)   Wt 64.5 kg (142 lb 3.2 oz)   SpO2 97%   BMI 21.62 kg/m²     Physical Exam  General - patient is alert   Speech - no dysarthria noted, no aphasia noted.     Neuro:   Cranial nerves: PERRL, EOMI, facial sensation intact to soft touch in V1, V2 and V3, no facial asymmetry noted, uvula/palate midline, tongue midline.   Motor: 5/5 throughout, normal tone, no pronator drift noted.   Sensory - intact to soft touch throughout  Reflexes - 2+ throughout  Coordination - no ataxia/dysmetria noted  Gait - normal    Neurological Exam          "

## 2025-01-22 NOTE — TELEPHONE ENCOUNTER
Provider requested f/u in 2 weeks, nothing available. Patient says provider may be able to fit her in somewhere. Please advise

## 2025-01-23 ENCOUNTER — RESULTS FOLLOW-UP (OUTPATIENT)
Dept: NEUROLOGY | Facility: CLINIC | Age: 45
End: 2025-01-23

## 2025-02-10 ENCOUNTER — TELEPHONE (OUTPATIENT)
Dept: NEUROLOGY | Facility: CLINIC | Age: 45
End: 2025-02-10

## 2025-02-18 ENCOUNTER — TELEMEDICINE (OUTPATIENT)
Dept: NEUROLOGY | Facility: CLINIC | Age: 45
End: 2025-02-18
Payer: COMMERCIAL

## 2025-02-18 DIAGNOSIS — R29.90 STROKE-LIKE EPISODE: Primary | ICD-10-CM

## 2025-02-18 PROCEDURE — 99215 OFFICE O/P EST HI 40 MIN: CPT | Performed by: PSYCHIATRY & NEUROLOGY

## 2025-02-18 NOTE — ASSESSMENT & PLAN NOTE
Secondary Prevention -   -for medications - recommend continuation of combination of   -Blood Pressure goal < 130/80, BP   -LDL goal <70, last LDL is   -sleep risk factors - need to screen for sleep apnea, needs to see sleep specialist if needed  -needs further cardiac workup with TREV, zio patch/loop recorder to look for causes of ESUS    Counseling/stroke education -   -I advised patient to avoid using NSAIDs for headaches or other pain and to stick to tylenol if needed  -Recommend lifestyle modifications such as mediterranean diet & regular exercise regimen atleast 4-5 times a week for 20-30 minutes.   -I educated patient/family regarding medication compliance  -encourage smoking cessation, and control of diabetes and hypertension; defer management to primary

## 2025-02-18 NOTE — PROGRESS NOTES
"Virtual Regular VisitName: Amira Jesus      : 1980      MRN: [id number]  Encounter Provider: Kathy Alvarado MD  Encounter Date: 2025   Encounter department: [facility] [place]  :  Assessment & Plan  Stroke-like episode  Patient was most likely vasovagal versus vasospasm versus convulsive syncope.  Low suspicion that this is a TIA or stroke since her workup is all negative and she has no vascular risk factors..  Workup was negative which consisted of MRI brain and CTA head and neck  No atherosclerotic disease noted but left ICA is tortous, questionable FMD, so will speak to the nurses to call patient back to let them know that she can take daily baby aspirin  BP has been stable, and no history of HTN  She is healthy, and leads an active lifestyle  Non-smoker  No DM     Follow up in 6 months     I would be happy to see the patient sooner if any new questions/concerns arise.  Patient/Guardian was advised to the call the office if they have any questions and concerns in the meantime.     We discussed \"red flag\" headache symptoms including symptoms such as facial droop on one side, weakness/paralysis on either side, speech trouble, numbness on one side, balance issues, any vision changes, extreme dizziness or significant increase in severity of headache or a sudden onset severe headache, patient is to call  immediately or to proceed to the nearest ER.         History of Present Illness     HPI    44-year-old female presents for follow-up for history of strokelike episode that occurred few weeks ago.    Since then she has traveled and she did not have any other symptoms headache or any other episodes since her last episode she is back to work back to driving she is better about hydrating and eating appropriately now.    No other questions or concerns today.    Review of Systems   Constitutional: Negative.    HENT: Negative.     Eyes: Negative.    Respiratory: Negative.   "   Cardiovascular: Negative.    Gastrointestinal: Negative.    Endocrine: Negative.    Genitourinary: Negative.    Musculoskeletal: Negative.    Skin: Negative.    Allergic/Immunologic: Negative.    Neurological: Negative.    Hematological: Negative.    Psychiatric/Behavioral: Negative.     All other systems reviewed and are negative.      Objective   There were no vitals taken for this visit.    Physical Exam    Administrative Statements   Encounter provider Kathy Alvarado MD    The Patient is located at Home and in the following state in which I hold an active license [place].    The patient was identified by name and date of birth. Amira Jesus was informed that this is a telemedicine visit and that the visit is being conducted through the Epic Embedded platform. She agrees to proceed..  My office door was closed. No one else was in the room.  She acknowledged consent and understanding of privacy and security of the video platform. The patient has agreed to participate and understands they can discontinue the visit at any time.    I have spent a total time of 40 minutes in caring for this patient on the day of the visit/encounter including Risks and benefits of tx options, Instructions for management, Counseling / Coordination of care, Documenting in the medical record, Reviewing/placing orders in the medical record (including tests, medications, and/or procedures), Obtaining or reviewing history  , and Communicating with other healthcare professionals .

## 2025-02-19 ENCOUNTER — TELEPHONE (OUTPATIENT)
Age: 45
End: 2025-02-19

## 2025-02-19 NOTE — TELEPHONE ENCOUNTER
I called patient to discuss results/recommendation;  she was not able to talk and wcb for results/recommendation CTA Scan.  (Further information on results encounter)  This is an addendum to original result that was given. I also sent secure chat message to Dr. Alvarado to confirm dose of ASA.

## 2025-02-19 NOTE — TELEPHONE ENCOUNTER
Warm transfer received from Neurology PEP and spoke with pt.    Reviewed message below with pt. Pt stated she will take ASA 81 mg daily. Thankful for the call back, no further questions.

## 2025-02-19 NOTE — TELEPHONE ENCOUNTER
Received message via secure chat  8:24 PM 2/18 from Dr. Avlarado:      Amira Jesus, 1980  To address tomorrow! Please call patient and let her know that one of the arteries especially left carotid may be torturous so ok to take aspirin daily. Spoke to radiology and that's what they said

## 2025-02-20 PROBLEM — G45.9 TIA (TRANSIENT ISCHEMIC ATTACK): Status: RESOLVED | Noted: 2025-01-22 | Resolved: 2025-02-20

## 2025-02-20 PROBLEM — R29.90 STROKE-LIKE EPISODE: Status: ACTIVE | Noted: 2025-02-20

## 2025-02-20 NOTE — ASSESSMENT & PLAN NOTE
"Patient was most likely vasovagal versus vasospasm versus convulsive syncope.  Low suspicion that this is a TIA or stroke since her workup is all negative and she has no vascular risk factors..  Workup was negative which consisted of MRI brain and CTA head and neck  No atherosclerotic disease noted but left ICA is tortous, questionable FMD, so will speak to the nurses to call patient back to let them know that she can take daily baby aspirin  BP has been stable, and no history of HTN  She is healthy, and leads an active lifestyle  Non-smoker  No DM     Follow up in 6 months     I would be happy to see the patient sooner if any new questions/concerns arise.  Patient/Guardian was advised to the call the office if they have any questions and concerns in the meantime.     We discussed \"red flag\" headache symptoms including symptoms such as facial droop on one side, weakness/paralysis on either side, speech trouble, numbness on one side, balance issues, any vision changes, extreme dizziness or significant increase in severity of headache or a sudden onset severe headache, patient is to call 9-1-1 immediately or to proceed to the nearest ER.     "

## 2025-03-03 ENCOUNTER — HOSPITAL ENCOUNTER (OUTPATIENT)
Dept: NEUROLOGY | Facility: HOSPITAL | Age: 45
Discharge: HOME | End: 2025-03-03
Payer: COMMERCIAL

## 2025-03-03 DIAGNOSIS — G45.9 TIA (TRANSIENT ISCHEMIC ATTACK): ICD-10-CM

## 2025-03-03 PROCEDURE — 95816 EEG AWAKE AND DROWSY: CPT | Performed by: PSYCHIATRY & NEUROLOGY

## 2025-03-03 PROCEDURE — 95816 EEG AWAKE AND DROWSY: CPT

## 2025-03-07 ENCOUNTER — TELEPHONE (OUTPATIENT)
Age: 45
End: 2025-03-07

## 2025-03-07 NOTE — TELEPHONE ENCOUNTER
Patient called to get the results of her EEG. Advised  EEG Interpretation:  This Routine EEG recorded during wakefulness, drowsiness, and sleep is normal.    Patient voiced clear understanding with no additional questions.

## 2025-03-17 ENCOUNTER — OFFICE VISIT (OUTPATIENT)
Age: 45
End: 2025-03-17
Payer: COMMERCIAL

## 2025-03-17 VITALS
HEIGHT: 68 IN | DIASTOLIC BLOOD PRESSURE: 68 MMHG | WEIGHT: 141 LBS | SYSTOLIC BLOOD PRESSURE: 110 MMHG | BODY MASS INDEX: 21.37 KG/M2 | OXYGEN SATURATION: 98 % | HEART RATE: 83 BPM

## 2025-03-17 DIAGNOSIS — R39.9 UTI SYMPTOMS: ICD-10-CM

## 2025-03-17 DIAGNOSIS — Z13.0 SCREENING FOR DEFICIENCY ANEMIA: ICD-10-CM

## 2025-03-17 DIAGNOSIS — Z00.00 HEALTHCARE MAINTENANCE: Primary | ICD-10-CM

## 2025-03-17 DIAGNOSIS — Z13.1 SCREENING FOR DIABETES MELLITUS: ICD-10-CM

## 2025-03-17 DIAGNOSIS — Z13.220 SCREENING FOR HYPERLIPIDEMIA: ICD-10-CM

## 2025-03-17 DIAGNOSIS — Z12.11 SCREENING FOR COLON CANCER: ICD-10-CM

## 2025-03-17 DIAGNOSIS — Z12.31 ENCOUNTER FOR SCREENING MAMMOGRAM FOR BREAST CANCER: ICD-10-CM

## 2025-03-17 DIAGNOSIS — R29.90 STROKE-LIKE EPISODE: ICD-10-CM

## 2025-03-17 PROCEDURE — 99396 PREV VISIT EST AGE 40-64: CPT | Performed by: INTERNAL MEDICINE

## 2025-03-17 RX ORDER — ASPIRIN 81 MG/1
81 TABLET ORAL DAILY
Start: 2025-03-17

## 2025-03-17 NOTE — ASSESSMENT & PLAN NOTE
Reji episode reviewed imaging of MRI and CTA also reviewed.  Appreciate note from neurology.  Recommend continuation of aspirin 81 mg recommend enterically coated if she will be using it for an extended period of time.  I have requested a lipid profile for updating would like to see her LDL less than 70.  Patient is a vegan and does not consume any significant animal protein.  I have recommended an increase in oats to try to reduce LDL values.    Orders:    aspirin (ECOTRIN LOW STRENGTH) 81 mg EC tablet; Take 1 tablet (81 mg total) by mouth daily

## 2025-03-17 NOTE — ASSESSMENT & PLAN NOTE
On today's visit with the patient we have examined her and reviewed her recent episode of a strokelike event.  Fortunately she has had no further symptoms since that event.  Believe it was most likely a hypotensive episode with tortuous left carotid artery resulting in right side weakness.  Full blood work has been requested and will be reviewed with the patient.  She should continue her vegan diet and also maintain regular exercise and current healthy weight.  Request for mammogram provided today baseline colonoscopy discussed with the patient and she will schedule visit with gastroenterologist

## 2025-03-17 NOTE — PROGRESS NOTES
Name: Amira Jesus      : 1980      MRN: [id number]  Encounter Provider: Poli Cast MD  Encounter Date: 3/17/2025   Encounter department: [facility]    Assessment & Plan  Screening for colon cancer         Encounter for screening mammogram for breast cancer    Orders:    Mammo screening bilateral w 3d and cad; Future    Stroke-like episode  Ropelike episode reviewed imaging of MRI and CTA also reviewed.  Appreciate note from neurology.  Recommend continuation of aspirin 81 mg recommend enterically coated if she will be using it for an extended period of time.  I have requested a lipid profile for updating would like to see her LDL less than 70.  Patient is a vegan and does not consume any significant animal protein.  I have recommended an increase in oats to try to reduce LDL values.    Orders:    aspirin (ECOTRIN LOW STRENGTH) 81 mg EC tablet; Take 1 tablet (81 mg total) by mouth daily    Screening for hyperlipidemia    Orders:    Lipid panel; Future    Screening for diabetes mellitus    Orders:    Comprehensive metabolic panel; Future    Screening for deficiency anemia    Orders:    CBC and differential; Future    UTI symptoms    Orders:    UA w Reflex to Microscopic w Reflex to Culture; Future    Healthcare maintenance  On today's visit with the patient we have examined her and reviewed her recent episode of a strokelike event.  Fortunately she has had no further symptoms since that event.  Believe it was most likely a hypotensive episode with tortuous left carotid artery resulting in right side weakness.  Full blood work has been requested and will be reviewed with the patient.  She should continue her vegan diet and also maintain regular exercise and current healthy weight.  Request for mammogram provided today baseline colonoscopy discussed with the patient and she will schedule visit with gastroenterologist              History of Present Illness     This  pleasant 44-year-old female patient presents to our office today for a annual physical examination and review of the laboratory testing.  Testing will be done at a later date and reviewed with the patient.    Since our last visit with the patient the patient did suffer an episode of transient right side weakness.  This occurred during a recent [employer] game she was watching at home.  She did undergo workup by neurology at [facility] including CTA and MRI imaging of the brain.  There was no evidence of damage to the brain nor any evidence of tumors or bleeding.  She was seen by neurology services and placed on aspirin therapy.  She reports no further episodes since this 1 event.  We reviewed how this may be related to some lower than average blood pressure if she becomes dehydrated and also a tortuous left internal carotid artery.      Review of Systems   All other systems reviewed and are negative.    Past Medical History:   Diagnosis Date    31 weeks gestation of pregnancy 2019    Abnormal Pap smear of cervix     Allergic     Last two weeks    Asthma     childhood     3 para 3     History of PCOS     HPV in female     Papanicolaou smear 2018    Polycystic ovary syndrome     Shingles     2019    Varicella     childhood     Past Surgical History:   Procedure Laterality Date     SECTION  2013    COLPOSCOPY      FOOT SURGERY Right     NOSE SURGERY      VAGINAL DELIVERY       Family History   Problem Relation Age of Onset    Miscarriages / Stillbirths Mother     No Known Problems Father     Stroke Maternal Grandmother     Hypertension Maternal Grandmother     Colon cancer Maternal Grandfather         80's    Hypertension Maternal Grandfather     Heart disease Paternal Grandmother     Heart disease Paternal Grandfather     Asthma Brother     No Known Problems Maternal Aunt     No Known Problems Maternal Aunt     No Known Problems Paternal Aunt      Social History     Tobacco Use    Smoking  "status: Never    Smokeless tobacco: Never   Vaping Use    Vaping status: Never Used   Substance and Sexual Activity    Alcohol use: Yes     Comment: Once a month    Drug use: No    Sexual activity: Yes     Partners: Male     Birth control/protection: Male Sterilization     Current Outpatient Medications on File Prior to Visit   Medication Sig    [DISCONTINUED] cetirizine (ZyrTEC) 10 mg tablet Take 10 mg by mouth daily (Patient not taking: Reported on 1/22/2025)    [DISCONTINUED] olopatadine (PATANOL) 0.1 % ophthalmic solution 1 drop 2 (two) times a day (Patient not taking: Reported on 1/22/2025)     Allergies   Allergen Reactions    Other      seasonal     Immunization History   Administered Date(s) Administered    COVID-19 Moderna Vac BIVALENT 12 Yr+ IM 0.5 ML 09/08/2022    COVID-19 PFIZER VACCINE 0.3 ML IM 12/22/2020, 01/11/2021, 09/25/2021    COVID-19 Pfizer mRNA vacc PF jorgito-sucrose 12 yr and older (Comirnaty) 09/04/2024    INFLUENZA 11/03/2021, 10/03/2022    Influenza, seasonal, injectable 10/16/2017    MMR 05/24/2018    Tdap 05/01/2018, 04/22/2020     Objective   /68   Pulse 83   Ht 5' 8\" (1.727 m)   Wt 64 kg (141 lb)   SpO2 98%   BMI 21.44 kg/m²     Physical Exam  Vitals and nursing note reviewed.   Constitutional:       General: She is not in acute distress.     Appearance: She is well-developed.   HENT:      Head: Normocephalic and atraumatic.      Right Ear: Tympanic membrane, ear canal and external ear normal.      Left Ear: Tympanic membrane, ear canal and external ear normal.      Nose: Nose normal.      Mouth/Throat:      Mouth: Mucous membranes are moist.      Pharynx: Oropharynx is clear.   Eyes:      Extraocular Movements: Extraocular movements intact.      Conjunctiva/sclera: Conjunctivae normal.      Pupils: Pupils are equal, round, and reactive to light.   Neck:      Vascular: No carotid bruit.   Cardiovascular:      Rate and Rhythm: Normal rate and regular rhythm.      Heart sounds: " Normal heart sounds. No murmur heard.  Pulmonary:      Effort: Pulmonary effort is normal. No respiratory distress.      Breath sounds: Normal breath sounds. No wheezing, rhonchi or rales.   Abdominal:      General: Abdomen is flat. Bowel sounds are normal. There is no distension.      Palpations: Abdomen is soft. There is no mass.      Tenderness: There is no abdominal tenderness. There is no guarding.   Musculoskeletal:      Cervical back: Normal range of motion and neck supple. No rigidity or tenderness.      Right lower leg: No edema.      Left lower leg: No edema.   Lymphadenopathy:      Cervical: No cervical adenopathy.   Skin:     General: Skin is warm and dry.      Capillary Refill: Capillary refill takes less than 2 seconds.   Neurological:      General: No focal deficit present.      Mental Status: She is alert. Mental status is at baseline.   Psychiatric:         Mood and Affect: Mood normal.         Behavior: Behavior normal.         Thought Content: Thought content normal.

## 2025-03-18 ENCOUNTER — APPOINTMENT (OUTPATIENT)
Dept: LAB | Facility: HOSPITAL | Age: 45
End: 2025-03-18
Payer: COMMERCIAL

## 2025-03-18 ENCOUNTER — RESULTS FOLLOW-UP (OUTPATIENT)
Age: 45
End: 2025-03-18

## 2025-03-18 DIAGNOSIS — R39.9 UTI SYMPTOMS: ICD-10-CM

## 2025-03-18 DIAGNOSIS — Z13.220 SCREENING FOR HYPERLIPIDEMIA: ICD-10-CM

## 2025-03-18 DIAGNOSIS — Z13.1 SCREENING FOR DIABETES MELLITUS: ICD-10-CM

## 2025-03-18 DIAGNOSIS — Z13.0 SCREENING FOR DEFICIENCY ANEMIA: ICD-10-CM

## 2025-03-18 LAB
ALBUMIN SERPL BCG-MCNC: 4.4 G/DL (ref 3.5–5)
ALP SERPL-CCNC: 47 U/L (ref 34–104)
ALT SERPL W P-5'-P-CCNC: 13 U/L (ref 7–52)
ANION GAP SERPL CALCULATED.3IONS-SCNC: 5 MMOL/L (ref 4–13)
AST SERPL W P-5'-P-CCNC: 17 U/L (ref 13–39)
BASOPHILS # BLD AUTO: 0.02 THOUSANDS/ÂΜL (ref 0–0.1)
BASOPHILS NFR BLD AUTO: 0 % (ref 0–1)
BILIRUB SERPL-MCNC: 0.63 MG/DL (ref 0.2–1)
BILIRUB UR QL STRIP: NEGATIVE
BUN SERPL-MCNC: 14 MG/DL (ref 5–25)
CALCIUM SERPL-MCNC: 9.3 MG/DL (ref 8.4–10.2)
CHLORIDE SERPL-SCNC: 105 MMOL/L (ref 96–108)
CHOLEST SERPL-MCNC: 159 MG/DL (ref ?–200)
CLARITY UR: CLEAR
CO2 SERPL-SCNC: 27 MMOL/L (ref 21–32)
COLOR UR: YELLOW
CREAT SERPL-MCNC: 0.7 MG/DL (ref 0.6–1.3)
EOSINOPHIL # BLD AUTO: 0.03 THOUSAND/ÂΜL (ref 0–0.61)
EOSINOPHIL NFR BLD AUTO: 1 % (ref 0–6)
ERYTHROCYTE [DISTWIDTH] IN BLOOD BY AUTOMATED COUNT: 13.8 % (ref 11.6–15.1)
GFR SERPL CREATININE-BSD FRML MDRD: 104 ML/MIN/1.73SQ M
GLUCOSE P FAST SERPL-MCNC: 85 MG/DL (ref 65–99)
GLUCOSE UR STRIP-MCNC: NEGATIVE MG/DL
HCT VFR BLD AUTO: 37.7 % (ref 34.8–46.1)
HDLC SERPL-MCNC: 72 MG/DL
HGB BLD-MCNC: 12.6 G/DL (ref 11.5–15.4)
HGB UR QL STRIP.AUTO: NEGATIVE
IMM GRANULOCYTES # BLD AUTO: 0.01 THOUSAND/UL (ref 0–0.2)
IMM GRANULOCYTES NFR BLD AUTO: 0 % (ref 0–2)
KETONES UR STRIP-MCNC: NEGATIVE MG/DL
LDLC SERPL CALC-MCNC: 72 MG/DL (ref 0–100)
LEUKOCYTE ESTERASE UR QL STRIP: NEGATIVE
LYMPHOCYTES # BLD AUTO: 1.45 THOUSANDS/ÂΜL (ref 0.6–4.47)
LYMPHOCYTES NFR BLD AUTO: 30 % (ref 14–44)
MCH RBC QN AUTO: 28.4 PG (ref 26.8–34.3)
MCHC RBC AUTO-ENTMCNC: 33.4 G/DL (ref 31.4–37.4)
MCV RBC AUTO: 85 FL (ref 82–98)
MONOCYTES # BLD AUTO: 0.37 THOUSAND/ÂΜL (ref 0.17–1.22)
MONOCYTES NFR BLD AUTO: 8 % (ref 4–12)
NEUTROPHILS # BLD AUTO: 2.97 THOUSANDS/ÂΜL (ref 1.85–7.62)
NEUTS SEG NFR BLD AUTO: 61 % (ref 43–75)
NITRITE UR QL STRIP: NEGATIVE
NONHDLC SERPL-MCNC: 87 MG/DL
NRBC BLD AUTO-RTO: 0 /100 WBCS
PH UR STRIP.AUTO: 6.5 [PH]
PLATELET # BLD AUTO: 254 THOUSANDS/UL (ref 149–390)
PMV BLD AUTO: 10.4 FL (ref 8.9–12.7)
POTASSIUM SERPL-SCNC: 4.3 MMOL/L (ref 3.5–5.3)
PROT SERPL-MCNC: 7 G/DL (ref 6.4–8.4)
PROT UR STRIP-MCNC: NEGATIVE MG/DL
RBC # BLD AUTO: 4.43 MILLION/UL (ref 3.81–5.12)
SODIUM SERPL-SCNC: 137 MMOL/L (ref 135–147)
SP GR UR STRIP.AUTO: 1.01 (ref 1–1.03)
TRIGL SERPL-MCNC: 76 MG/DL (ref ?–150)
UROBILINOGEN UR STRIP-ACNC: <2 MG/DL
WBC # BLD AUTO: 4.85 THOUSAND/UL (ref 4.31–10.16)

## 2025-03-18 PROCEDURE — 80053 COMPREHEN METABOLIC PANEL: CPT

## 2025-03-18 PROCEDURE — 81003 URINALYSIS AUTO W/O SCOPE: CPT

## 2025-03-18 PROCEDURE — 80061 LIPID PANEL: CPT

## 2025-03-18 PROCEDURE — 85025 COMPLETE CBC W/AUTO DIFF WBC: CPT

## 2025-03-18 PROCEDURE — 36415 COLL VENOUS BLD VENIPUNCTURE: CPT

## 2025-04-09 ENCOUNTER — TELEPHONE (OUTPATIENT)
Dept: GASTROENTEROLOGY | Facility: CLINIC | Age: 45
End: 2025-04-09

## 2025-04-09 NOTE — TELEPHONE ENCOUNTER
----- Message from Camille CAMP sent at 4/9/2025  7:40 AM EDT -----    ----- Message -----  From: Mitra Laura DO  Sent: 4/7/2025   8:39 AM EDT  To: #    Please reach out to pt and get her scheduled for colonosocpy with me at any site.  I put her in for clenpiq.    She is a nurse anesthestist colleague I work with.        Thank you,  Teresa

## 2025-04-09 NOTE — TELEPHONE ENCOUNTER
Called patient, lvm to call so that we can assist patient with scheduling the colonoscopy only with Dr. 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 Procedure prep - Clenpiq.

## 2025-04-10 ENCOUNTER — TELEPHONE (OUTPATIENT)
Age: 45
End: 2025-04-10

## 2025-04-10 NOTE — TELEPHONE ENCOUNTER
Scheduled date of colonoscopy (as of today):5/21/25  Physician performing colonoscopy:Dr Laura  Location of colonoscopy:[facility]  Bowel prep reviewed with patient:pt confirmed prep instructions reviewed with Dr. Laura, I will email the clenpiq prep instructions to pt email [email or web address]     Instructions reviewed with patient by:cesar  Clearances: na    Colonoscopy order already in the system.

## 2025-04-10 NOTE — TELEPHONE ENCOUNTER
04/10/25  Screened by: Krista Reyez    Referring Provider     Pre- Screening:     There is no height or weight on file to calculate BMI. 21.44  Has patient been referred for a routine screening Colonoscopy? yes  Is the patient between 45-75 years old? yes      Previous Colonoscopy no   If yes:    Date:     Facility:     Reason:           Does the patient want to see a Gastroenterologist prior to their procedure OR are they having any GI symptoms? no    Has the patient been hospitalized or had abdominal surgery in the past 6 months? no    Does the patient use supplemental oxygen? no    Does the patient take Coumadin, Lovenox, Plavix, Elliquis, Xarelto, or other blood thinning medication? no    Has the patient had a stroke, cardiac event, or stent placed in the past year? no        If patient is between 45yrs - 49yrs, please advise patient that we will have to confirm benefits & coverage with their insurance company for a routine screening colonoscopy.

## 2025-04-16 PROBLEM — Z00.00 HEALTHCARE MAINTENANCE: Status: RESOLVED | Noted: 2025-03-17 | Resolved: 2025-04-16

## 2025-05-07 ENCOUNTER — ANESTHESIA (OUTPATIENT)
Dept: ANESTHESIOLOGY | Facility: HOSPITAL | Age: 45
End: 2025-05-07

## 2025-05-07 ENCOUNTER — ANESTHESIA EVENT (OUTPATIENT)
Dept: ANESTHESIOLOGY | Facility: HOSPITAL | Age: 45
End: 2025-05-07

## 2025-05-21 ENCOUNTER — HOSPITAL ENCOUNTER (OUTPATIENT)
Dept: GASTROENTEROLOGY | Facility: AMBULARY SURGERY CENTER | Age: 45
Setting detail: OUTPATIENT SURGERY
Discharge: HOME | End: 2025-05-21
Attending: INTERNAL MEDICINE
Payer: COMMERCIAL

## 2025-05-21 ENCOUNTER — ANESTHESIA EVENT (OUTPATIENT)
Dept: GASTROENTEROLOGY | Facility: AMBULARY SURGERY CENTER | Age: 45
End: 2025-05-21
Payer: COMMERCIAL

## 2025-05-21 VITALS
TEMPERATURE: 97.5 F | RESPIRATION RATE: 18 BRPM | WEIGHT: 136 LBS | DIASTOLIC BLOOD PRESSURE: 57 MMHG | BODY MASS INDEX: 20.61 KG/M2 | SYSTOLIC BLOOD PRESSURE: 107 MMHG | HEART RATE: 62 BPM | HEIGHT: 68 IN | OXYGEN SATURATION: 100 %

## 2025-05-21 DIAGNOSIS — Z12.11 COLON CANCER SCREENING: ICD-10-CM

## 2025-05-21 LAB
EXT PREGNANCY TEST URINE: NEGATIVE
EXT. CONTROL: NORMAL

## 2025-05-21 PROCEDURE — G0121 COLON CA SCRN NOT HI RSK IND: HCPCS | Performed by: INTERNAL MEDICINE

## 2025-05-21 PROCEDURE — 81025 URINE PREGNANCY TEST: CPT | Performed by: INTERNAL MEDICINE

## 2025-05-21 RX ORDER — PROPOFOL 10 MG/ML
INJECTION, EMULSION INTRAVENOUS CONTINUOUS PRN
Status: DISCONTINUED | OUTPATIENT
Start: 2025-05-21 | End: 2025-05-21

## 2025-05-21 RX ORDER — SODIUM CHLORIDE, SODIUM LACTATE, POTASSIUM CHLORIDE, CALCIUM CHLORIDE 600; 310; 30; 20 MG/100ML; MG/100ML; MG/100ML; MG/100ML
125 INJECTION, SOLUTION INTRAVENOUS CONTINUOUS
Status: DISCONTINUED | OUTPATIENT
Start: 2025-05-21 | End: 2025-05-25 | Stop reason: HOSPADM

## 2025-05-21 RX ORDER — LIDOCAINE HYDROCHLORIDE 10 MG/ML
INJECTION, SOLUTION EPIDURAL; INFILTRATION; INTRACAUDAL; PERINEURAL AS NEEDED
Status: DISCONTINUED | OUTPATIENT
Start: 2025-05-21 | End: 2025-05-21

## 2025-05-21 RX ORDER — PROPOFOL 10 MG/ML
INJECTION, EMULSION INTRAVENOUS AS NEEDED
Status: DISCONTINUED | OUTPATIENT
Start: 2025-05-21 | End: 2025-05-21

## 2025-05-21 RX ADMIN — SODIUM CHLORIDE, SODIUM LACTATE, POTASSIUM CHLORIDE, AND CALCIUM CHLORIDE: .6; .31; .03; .02 INJECTION, SOLUTION INTRAVENOUS at 10:40

## 2025-05-21 RX ADMIN — LIDOCAINE HYDROCHLORIDE 40 MG: 10 INJECTION, SOLUTION EPIDURAL; INFILTRATION; INTRACAUDAL; PERINEURAL at 11:15

## 2025-05-21 RX ADMIN — Medication 40 MG: at 11:25

## 2025-05-21 RX ADMIN — PROPOFOL 100 MCG/KG/MIN: 10 INJECTION, EMULSION INTRAVENOUS at 11:16

## 2025-05-21 RX ADMIN — PROPOFOL 30 MG: 10 INJECTION, EMULSION INTRAVENOUS at 11:26

## 2025-05-21 RX ADMIN — PROPOFOL 100 MG: 10 INJECTION, EMULSION INTRAVENOUS at 11:16

## 2025-05-21 NOTE — H&P
"History and Physical -  [facility]  Amira Jesus 44 y.o. female MRN: [id number]                  HPI: Amira Jesus is a 44 y.o. year old female who presents for colon cancer screening.      REVIEW OF SYSTEMS: Per the HPI, and otherwise unremarkable.    Historical Information   Past Medical History:   Diagnosis Date    31 weeks gestation of pregnancy 2019    Abnormal Pap smear of cervix     Allergic     Last two weeks    Asthma     childhood     3 para 3     History of PCOS     HPV in female     Papanicolaou smear 2018    Polycystic ovary syndrome     Shingles     2019    Varicella     childhood     Past Surgical History:   Procedure Laterality Date     SECTION  2013    COLPOSCOPY      FOOT SURGERY Right     NOSE SURGERY      VAGINAL DELIVERY       Social History   Social History     Substance and Sexual Activity   Alcohol Use Yes    Comment: Once a month     Social History     Substance and Sexual Activity   Drug Use No     Tobacco Use History[1]  Family History   Problem Relation Age of Onset    Miscarriages / Stillbirths Mother     No Known Problems Father     Stroke Maternal Grandmother     Hypertension Maternal Grandmother     Colon cancer Maternal Grandfather         80's    Hypertension Maternal Grandfather     Heart disease Paternal Grandmother     Heart disease Paternal Grandfather     Asthma Brother     No Known Problems Maternal Aunt     No Known Problems Maternal Aunt     No Known Problems Paternal Aunt        Meds/Allergies     Current Medications[2]    Allergies[3]    Objective     /76   Pulse 76   Temp 97.9 °F (36.6 °C) (Temporal)   Resp (!) 23   Ht 5' 8\" (1.727 m)   Wt 61.7 kg (136 lb)   SpO2 100%   Breastfeeding No   BMI 20.68 kg/m²       PHYSICAL EXAM    Gen: NAD  Head: NCAT  CV: RRR  CHEST: Clear  ABD: soft, NT/ND  EXT: no edema      ASSESSMENT/PLAN:  This is a 44 y.o. year old female here for colonoscopy, and she is stable and " optimized for her procedure.             [1]   Social History  Tobacco Use   Smoking Status Never   Smokeless Tobacco Never   [2]   Current Outpatient Medications:     aspirin (ECOTRIN LOW STRENGTH) 81 mg EC tablet    Current Facility-Administered Medications:     lactated ringers infusion, 125 mL/hr, Intravenous, Continuous  [3]   Allergies  Allergen Reactions    Other      seasonal

## 2025-05-21 NOTE — ANESTHESIA POSTPROCEDURE EVALUATION
Post-Op Assessment Note    CV Status:  Stable    Pain management: adequate       Mental Status:  Alert and awake   Hydration Status:  Euvolemic   PONV Controlled:  Controlled   Airway Patency:  Patent     Post Op Vitals Reviewed: Yes    No anethesia notable event occurred.    Staff: Anesthesiologist, with CRNAs           Last Filed PACU Vitals:  Vitals Value Taken Time   Temp 97.5 °F (36.4 °C) 05/21/25 11:40   Pulse 62 05/21/25 11:55   /57 05/21/25 11:55   Resp 18 05/21/25 11:55   SpO2 100 % 05/21/25 11:55       Modified Jassi:     Vitals Value Taken Time   Activity 2 05/21/25 11:55   Respiration 2 05/21/25 11:55   Circulation 2 05/21/25 11:55   Consciousness 2 05/21/25 11:55   Oxygen Saturation 2 05/21/25 11:55     Modified Jassi Score: 10

## 2025-05-21 NOTE — ANESTHESIA POSTPROCEDURE EVALUATION
Post-Op Assessment Note    CV Status:  Stable  Pain Score: 0    Pain management: adequate       Mental Status:  Alert and awake   Hydration Status:  Euvolemic   PONV Controlled:  Controlled   Airway Patency:  Patent  Two or more mitigation strategies used for obstructive sleep apnea   Post Op Vitals Reviewed: Yes      Staff: Anesthesiologist, CRNA           Last Filed PACU Vitals:  Vitals Value Taken Time   Temp     Pulse     BP     Resp     SpO2

## 2025-05-21 NOTE — ANESTHESIA PREPROCEDURE EVALUATION
Procedure:  COLONOSCOPY    Relevant Problems   ANESTHESIA (within normal limits)      CARDIO (within normal limits)      ENDO (within normal limits)      GI/HEPATIC (within normal limits)      /RENAL (within normal limits)      GYN (within normal limits)      HEMATOLOGY (within normal limits)      MUSCULOSKELETAL (within normal limits)      NEURO/PSYCH   (+) TIA (transient ischemic attack)      PULMONARY (within normal limits)        Physical Exam    Airway     Mallampati score: I  TM Distance: >3 FB  Neck ROM: full      Cardiovascular      Dental   No notable dental hx     Pulmonary  Pulmonary exam normal     Neurological  - normal exam    Other Findings  post-pubertal.      Anesthesia Plan  ASA Score- 1     Anesthesia Type- IV sedation with anesthesia with ASA Monitors.         Additional Monitors:     Airway Plan:            Plan Factors-Exercise tolerance (METS): >4 METS.    Chart reviewed.    Patient summary reviewed.    Patient is not a current smoker.              Induction- intravenous.    Postoperative Plan- .   Monitoring Plan - Monitoring plan - standard ASA monitoring          Informed Consent- Anesthetic plan and risks discussed with patient.  I personally reviewed this patient with the CRNA. Discussed and agreed on the Anesthesia Plan with the CRNA..      NPO Status:  No vitals data found for the desired time range.         Returned call and left message for patient's wife Cristina at [phone] regarding what procedure. Patient to return call to Litzy [phone] to schedule.